# Patient Record
Sex: FEMALE | Race: WHITE | NOT HISPANIC OR LATINO | Employment: OTHER | URBAN - METROPOLITAN AREA
[De-identification: names, ages, dates, MRNs, and addresses within clinical notes are randomized per-mention and may not be internally consistent; named-entity substitution may affect disease eponyms.]

---

## 2018-04-06 ENCOUNTER — OFFICE VISIT (OUTPATIENT)
Dept: SURGERY | Facility: CLINIC | Age: 68
End: 2018-04-06

## 2018-04-06 VITALS — BODY MASS INDEX: 19.25 KG/M2 | HEIGHT: 68 IN | HEART RATE: 87 BPM | WEIGHT: 127 LBS | OXYGEN SATURATION: 99 %

## 2018-04-06 DIAGNOSIS — K64.4 EXTERNAL HEMORRHOID: Primary | ICD-10-CM

## 2018-04-06 PROCEDURE — 99203 OFFICE O/P NEW LOW 30 MIN: CPT | Performed by: SURGERY

## 2018-04-06 RX ORDER — MULTIVITAMIN
1 CAPSULE ORAL DAILY
COMMUNITY

## 2018-04-06 RX ORDER — HYDROXYZINE HYDROCHLORIDE 10 MG/1
10 TABLET, FILM COATED ORAL 2 TIMES DAILY
COMMUNITY
End: 2019-06-28 | Stop reason: ALTCHOICE

## 2018-04-20 NOTE — PROGRESS NOTES
General Surgery  Initial Office Visit    CC: Rectal bleeding    HPI: The patient is a pleasant 68 y.o. year-old lady who presents today for a recent episode of acute onset rectal bleeding that occurred one week ago. The bleeding occurred after she developed what she thought was an external hemorrhoid. The bleeding persisted for a few days but slowly improved until it stopped altogether a few days ago. She has never had rectal bleeding like this before.    Past Medical History: Anxiety    Past Surgical History: None    Medications: Hydroxyzine, multivitamin    Allergies: No known drug allergies    Family History: Father with prostate cancer and coronary artery disease, brother with lymphoma, sister with sarcoma    Social History: , retired [employer] teacher, nonsmoker, no regular alcohol use    ROS:   Constitutional: Negative for fevers or chills  HENT: Negative for hearing loss or runny nose  Eyes: Negative for vision changes or scleral icterus  Respiratory: Negative for cough or shortness of breath  Cardiovascular: Negative for chest pain or heart palpitations  Gastrointestinal: Positive for hematochezia; Negative for abdominal pain, nausea, vomiting, constipation, melena, or reflux  Genitourinary: Positive for vulvodynia; Negative for hematuria or dysuria  Musculoskeletal: Positive for back pain; Negative for joint pain  Neurologic: Negative for headaches or dizziness  Psychiatric: Positive for nervousness and anxiety; Negative for depression  All other systems reviewed and negative    Physical Exam:  Vitals:    04/06/18 0857   Pulse: 87   SpO2: 99%     General: No acute distress, well-nourished & well-developed  HEAD: normocephalic, atraumatic  EYES: normal conjunctiva, sclera anicteric  EARS: grossly normal hearing  NECK: supple, no thyromegaly  CARDIOVASCULAR: regular rate and rhythm  RESPIRATORY: clear to auscultation bilaterally  GASTROINTESTINAL: soft, nontender, non-distended  MUSCULOSKELETAL: normal gait  and station. No gross extremity abnormalities  PSYCHIATRIC: oriented x3, normal mood and affect  RECTUM: external hemorrhoid with small area of skin breakdown indicative of recently evacuated thrombosed external hemorrhoid    ASSESSMENT & PLAN  Mrs. Cruz is a 68 year-old lady with a recently thrombosed external hemorrhoid which seems to have spontaneously drained on its own. I have recommended she avoid constipation with the use of stool softeners, fiber supplements, and/or laxatives. I have also prescribed Proctofoam for her to use as needed for any recurrent hemorrhoids. She can follow-up with me as needed.    Crystal Evangelista MD  General and Endoscopic Surgery  [facility]    [address], [address]  [place], [place], [address]  P: [phone]  F: [phone]

## 2018-05-30 RX ORDER — GABAPENTIN 100 MG/1
100 CAPSULE ORAL 3 TIMES DAILY
COMMUNITY
Start: 2017-05-13 | End: 2018-08-13

## 2018-06-01 ENCOUNTER — OFFICE VISIT (OUTPATIENT)
Dept: INTERNAL MEDICINE | Age: 68
End: 2018-06-01

## 2018-06-01 VITALS
RESPIRATION RATE: 13 BRPM | OXYGEN SATURATION: 96 % | TEMPERATURE: 97.7 F | HEART RATE: 66 BPM | DIASTOLIC BLOOD PRESSURE: 70 MMHG | HEIGHT: 68 IN | WEIGHT: 124 LBS | SYSTOLIC BLOOD PRESSURE: 138 MMHG | BODY MASS INDEX: 18.79 KG/M2

## 2018-06-01 DIAGNOSIS — M79.606 LOW BACK PAIN RADIATING TO LOWER EXTREMITY: Primary | ICD-10-CM

## 2018-06-01 DIAGNOSIS — R53.83 FATIGUE, UNSPECIFIED TYPE: ICD-10-CM

## 2018-06-01 DIAGNOSIS — M54.31 SCIATICA OF RIGHT SIDE: ICD-10-CM

## 2018-06-01 DIAGNOSIS — M54.50 LOW BACK PAIN RADIATING TO LOWER EXTREMITY: Primary | ICD-10-CM

## 2018-06-01 PROBLEM — R07.9 CHEST PAIN: Status: ACTIVE | Noted: 2018-06-01

## 2018-06-01 PROBLEM — F41.9 ANXIETY: Status: ACTIVE | Noted: 2018-06-01

## 2018-06-01 PROBLEM — J18.9 PNA (PNEUMONIA): Status: ACTIVE | Noted: 2018-06-01

## 2018-06-01 PROBLEM — L65.9 ALOPECIA: Status: ACTIVE | Noted: 2018-06-01

## 2018-06-01 PROBLEM — J18.9 PNA (PNEUMONIA): Status: RESOLVED | Noted: 2018-06-01 | Resolved: 2018-06-01

## 2018-06-01 PROBLEM — R10.9 ABDOMINAL PAIN: Status: ACTIVE | Noted: 2018-06-01

## 2018-06-01 PROCEDURE — 99214 OFFICE O/P EST MOD 30 MIN: CPT | Performed by: INTERNAL MEDICINE

## 2018-06-01 NOTE — PROGRESS NOTES
"  Tricia Cruz is a 68 y.o. female who presents with   Chief Complaint   Patient presents with   • Back Pain   • Sciatic like symptoms-right foot numb   • Fatigue   .    68-year-old female who I am seeing for the first time.  Former patient of Dr. Chacon.  Patient presents with a history of 25 years of chronic back pain with a prior diagnosis of \"L4-5 facet syndrome\".  Has recently been under chiropractic care for this but is now having pain in her low back, hips, coccyx and also numbness in her right foot suggesting right leg sciatic like symptoms.  She says her current symptoms are new and started about 4 weeks ago and are constantly present but gradually worsening over the course of time.      Back Pain   This is a new problem. The current episode started 1 to 4 weeks ago. The problem occurs constantly. The problem has been gradually worsening since onset. The pain is present in the gluteal and sacro-iliac. The quality of the pain is described as aching and shooting. The pain radiates to the left foot, left thigh, right foot and right thigh. The pain is at a severity of 7/10. The pain is the same all the time. The symptoms are aggravated by position, lying down and sitting. Stiffness is present all day. Associated symptoms include leg pain, numbness, paresthesias, pelvic pain and tingling. Pertinent negatives include no abdominal pain, bladder incontinence, bowel incontinence, chest pain, dysuria, fever, headaches, paresis, perianal numbness, weakness or weight loss. Risk factors include menopause. She has tried nothing for the symptoms.   Fatigue   This is a chronic problem. The current episode started more than 1 month ago. The problem occurs intermittently. The problem has been resolved. Associated symptoms include fatigue and numbness. Pertinent negatives include no abdominal pain, chest pain, fever, headaches or weakness. She has tried nothing for the symptoms.        The following portions of the " patient's history were reviewed and updated as appropriate: allergies, current medications, past medical history and problem list.    Review of Systems   Constitutional: Positive for fatigue. Negative for fever and weight loss.   HENT: Negative.    Eyes: Negative.    Respiratory: Negative.    Cardiovascular: Negative.  Negative for chest pain.   Gastrointestinal: Negative for abdominal pain and bowel incontinence.   Genitourinary: Positive for pelvic pain. Negative for bladder incontinence and dysuria.   Musculoskeletal: Positive for back pain.   Skin: Negative.    Neurological: Positive for tingling, numbness and paresthesias. Negative for weakness and headaches.   Psychiatric/Behavioral: Negative.        Objective   Physical Exam   Constitutional: She is oriented to person, place, and time. She appears well-developed and well-nourished. No distress.   HENT:   Head: Normocephalic and atraumatic.   Eyes: Conjunctivae and EOM are normal. Pupils are equal, round, and reactive to light.   Neck: Normal range of motion. Neck supple. No thyromegaly present.   Neck exam negative.  Carotid auscultation normal-no bruits heard.   Cardiovascular: Normal rate, regular rhythm, normal heart sounds and intact distal pulses.  Exam reveals no gallop and no friction rub.    No murmur heard.  Pulmonary/Chest: Effort normal and breath sounds normal. No respiratory distress. She has no wheezes. She has no rales. She exhibits no tenderness.   Musculoskeletal: Normal range of motion. She exhibits no edema, tenderness or deformity.   Neurological: She is alert and oriented to person, place, and time. No cranial nerve deficit or sensory deficit. She exhibits normal muscle tone. Coordination normal.   Skin: No rash noted. No erythema.   Psychiatric: She has a normal mood and affect. Her behavior is normal. Judgment and thought content normal.   Nursing note and vitals reviewed.      Assessment/Plan   Tricia was seen today for back pain,  sciatic like symptoms-right foot numb and fatigue.    Diagnoses and all orders for this visit:    Low back pain radiating to lower extremity  -     Comprehensive Metabolic Panel  -     Lipid Panel  -     MRI Lumbar Spine Without Contrast; Future    Sciatica of right side  -     Comprehensive Metabolic Panel  -     Lipid Panel  -     MRI Lumbar Spine Without Contrast; Future    Fatigue, unspecified type  -     Comprehensive Metabolic Panel  -     CBC & Differential  -     Lipid Panel  -     TSH+Free T4      Plan: This lady has not had any radiographic studies recently on her back she says nor has she had any recent blood testing done.  She does take gabapentin 900 mg 3 times daily but apparently this is being prescribed by a gynecologist.    I'm going to get her set up for an open MRI at the facility on [facility] per her request since she has some element of claustrophobia she says.  We will also get some basic labs as above to assess her overall general metabolism.  She will try Advil, 2 tablets 3 times a day for her current back/right leg symptoms pending today's lab results and MRI report.  Further advice to follow once reports are in.

## 2018-06-02 LAB
ALBUMIN SERPL-MCNC: 4.5 G/DL (ref 3.5–5.2)
ALBUMIN/GLOB SERPL: 2.3 G/DL
ALP SERPL-CCNC: 67 U/L (ref 39–117)
ALT SERPL-CCNC: 18 U/L (ref 1–33)
AST SERPL-CCNC: 19 U/L (ref 1–32)
BASOPHILS # BLD AUTO: 0.01 10*3/MM3 (ref 0–0.2)
BASOPHILS NFR BLD AUTO: 0.2 % (ref 0–1.5)
BILIRUB SERPL-MCNC: 0.5 MG/DL (ref 0.1–1.2)
BUN SERPL-MCNC: 20 MG/DL (ref 8–23)
BUN/CREAT SERPL: 19.2 (ref 7–25)
CALCIUM SERPL-MCNC: 9.6 MG/DL (ref 8.6–10.5)
CHLORIDE SERPL-SCNC: 100 MMOL/L (ref 98–107)
CHOLEST SERPL-MCNC: 183 MG/DL (ref 0–200)
CO2 SERPL-SCNC: 27.4 MMOL/L (ref 22–29)
CREAT SERPL-MCNC: 1.04 MG/DL (ref 0.57–1)
EOSINOPHIL # BLD AUTO: 0.04 10*3/MM3 (ref 0–0.7)
EOSINOPHIL NFR BLD AUTO: 0.9 % (ref 0.3–6.2)
ERYTHROCYTE [DISTWIDTH] IN BLOOD BY AUTOMATED COUNT: 12.7 % (ref 11.7–13)
GFR SERPLBLD CREATININE-BSD FMLA CKD-EPI: 53 ML/MIN/1.73
GFR SERPLBLD CREATININE-BSD FMLA CKD-EPI: 64 ML/MIN/1.73
GLOBULIN SER CALC-MCNC: 2 GM/DL
GLUCOSE SERPL-MCNC: 86 MG/DL (ref 65–99)
HCT VFR BLD AUTO: 39.8 % (ref 35.6–45.5)
HDLC SERPL-MCNC: 87 MG/DL (ref 40–60)
HGB BLD-MCNC: 12.4 G/DL (ref 11.9–15.5)
IMM GRANULOCYTES # BLD: 0 10*3/MM3 (ref 0–0.03)
IMM GRANULOCYTES NFR BLD: 0 % (ref 0–0.5)
LDLC SERPL CALC-MCNC: 88 MG/DL (ref 0–100)
LYMPHOCYTES # BLD AUTO: 1.44 10*3/MM3 (ref 0.9–4.8)
LYMPHOCYTES NFR BLD AUTO: 33.4 % (ref 19.6–45.3)
MCH RBC QN AUTO: 30.8 PG (ref 26.9–32)
MCHC RBC AUTO-ENTMCNC: 31.2 G/DL (ref 32.4–36.3)
MCV RBC AUTO: 98.8 FL (ref 80.5–98.2)
MONOCYTES # BLD AUTO: 0.57 10*3/MM3 (ref 0.2–1.2)
MONOCYTES NFR BLD AUTO: 13.2 % (ref 5–12)
NEUTROPHILS # BLD AUTO: 2.25 10*3/MM3 (ref 1.9–8.1)
NEUTROPHILS NFR BLD AUTO: 52.3 % (ref 42.7–76)
PLATELET # BLD AUTO: 162 10*3/MM3 (ref 140–500)
POTASSIUM SERPL-SCNC: 4.3 MMOL/L (ref 3.5–5.2)
PROT SERPL-MCNC: 6.5 G/DL (ref 6–8.5)
RBC # BLD AUTO: 4.03 10*6/MM3 (ref 3.9–5.2)
SODIUM SERPL-SCNC: 143 MMOL/L (ref 136–145)
T4 FREE SERPL-MCNC: 0.97 NG/DL (ref 0.93–1.7)
TRIGL SERPL-MCNC: 42 MG/DL (ref 0–150)
TSH SERPL DL<=0.005 MIU/L-ACNC: 3.55 MIU/ML (ref 0.27–4.2)
VLDLC SERPL CALC-MCNC: 8.4 MG/DL (ref 5–40)
WBC # BLD AUTO: 4.31 10*3/MM3 (ref 4.5–10.7)

## 2018-06-04 ENCOUNTER — TELEPHONE (OUTPATIENT)
Dept: INTERNAL MEDICINE | Age: 68
End: 2018-06-04

## 2018-06-04 NOTE — PROGRESS NOTES
Thyroid normal but on the lower end of normal. All other labs are within normal / acceptable ranges. Continue all current meds as they are. A followup visit to be seen and have labs updated in six months is advised. If any problems persist a followup visit is advised.

## 2018-06-04 NOTE — TELEPHONE ENCOUNTER
Pt called stating she called [facility] on [facility] and they have not received the order for pt's MRI for Lumbar Spine. They told her as soon as they receive it, they will call her to schedule.  [facility]  Pt's # [phone]  Thanks SP

## 2018-06-14 NOTE — TELEPHONE ENCOUNTER
Pt called stating she hasn't heard from our office pertaining to her insurance covering the MRI lumbar spine. She mentioned she thought Dr Leach was having to do a peer to peer. Nothing in pt's chart.  Pt will be in tomorrow to see Sakina for UTI and was going to ask about the MRI.  Thanks SP

## 2018-06-15 ENCOUNTER — OFFICE VISIT (OUTPATIENT)
Dept: INTERNAL MEDICINE | Age: 68
End: 2018-06-15

## 2018-06-15 VITALS
WEIGHT: 125.8 LBS | SYSTOLIC BLOOD PRESSURE: 130 MMHG | HEART RATE: 86 BPM | DIASTOLIC BLOOD PRESSURE: 72 MMHG | TEMPERATURE: 98.1 F | OXYGEN SATURATION: 98 % | HEIGHT: 68 IN | BODY MASS INDEX: 19.07 KG/M2

## 2018-06-15 DIAGNOSIS — M79.606 LOW BACK PAIN RADIATING TO LOWER EXTREMITY: ICD-10-CM

## 2018-06-15 DIAGNOSIS — Z87.440 HISTORY OF UTI: Primary | ICD-10-CM

## 2018-06-15 DIAGNOSIS — N94.819 VULVODYNIA: ICD-10-CM

## 2018-06-15 DIAGNOSIS — M54.50 LOW BACK PAIN RADIATING TO LOWER EXTREMITY: ICD-10-CM

## 2018-06-15 PROCEDURE — 99214 OFFICE O/P EST MOD 30 MIN: CPT | Performed by: NURSE PRACTITIONER

## 2018-06-15 RX ORDER — MELOXICAM 7.5 MG/1
7.5 TABLET ORAL DAILY
Qty: 30 TABLET | Refills: 0 | Status: SHIPPED | OUTPATIENT
Start: 2018-06-15 | End: 2018-07-16 | Stop reason: SINTOL

## 2018-06-15 NOTE — PATIENT INSTRUCTIONS
Back Pain, Adult    Many adults have back pain from time to time. Common causes of back pain include:  · A strained muscle or ligament.  · Wear and tear (degeneration) of the spinal disks.  · Arthritis.  · A hit to the back.    Back pain can be short-lived (acute) or last a long time (chronic). A physical exam, lab tests, and imaging studies may be done to find the cause of your pain.  Follow these instructions at home:  Managing pain and stiffness  · Take over-the-counter and prescription medicines only as told by your health care provider.  · If directed, apply heat to the affected area as often as told by your health care provider. Use the heat source that your health care provider recommends, such as a moist heat pack or a heating pad.  ? Place a towel between your skin and the heat source.  ? Leave the heat on for 20-30 minutes.  ? Remove the heat if your skin turns bright red. This is especially important if you are unable to feel pain, heat, or cold. You have a greater risk of getting burned.  · If directed, apply ice to the injured area:  ? Put ice in a plastic bag.  ? Place a towel between your skin and the bag.  ? Leave the ice on for 20 minutes, 2-3 times a day for the first 2-3 days.  Activity  · Do not stay in bed. Resting more than 1-2 days can delay your recovery.  · Take short walks on even surfaces as soon as you are able. Try to increase the length of time you walk each day.  · Do not sit, drive, or  one place for more than 30 minutes at a time. Sitting or standing for long periods of time can put stress on your back.  · Use proper lifting techniques. When you bend and lift, use positions that put less stress on your back:  ? Bend your knees.  ? Keep the load close to your body.  ? Avoid twisting.  · Exercise regularly as told by your health care provider. Exercising will help your back heal faster. This also helps prevent back injuries by keeping muscles strong and flexible.  · Your health  care provider may recommend that you see a physical therapist. This person can help you come up with a safe exercise program. Do any exercises as told by your physical therapist.  Lifestyle  · Maintain a healthy weight. Extra weight puts stress on your back and makes it difficult to have good posture.  · Avoid activities or situations that make you feel anxious or stressed. Learn ways to manage anxiety and stress. One way to manage stress is through exercise. Stress and anxiety increase muscle tension and can make back pain worse.  General instructions  · Sleep on a firm mattress in a comfortable position. Try lying on your side with your knees slightly bent. If you lie on your back, put a pillow under your knees.  · Follow your treatment plan as told by your health care provider. This may include:  ? Cognitive or behavioral therapy.  ? Acupuncture or massage therapy.  ? Meditation or yoga.  Contact a health care provider if:  · You have pain that is not relieved with rest or medicine.  · You have increasing pain going down into your legs or buttocks.  · Your pain does not improve in 2 weeks.  · You have pain at night.  · You lose weight.  · You have a fever or chills.  Get help right away if:  · You develop new bowel or bladder control problems.  · You have unusual weakness or numbness in your arms or legs.  · You develop nausea or vomiting.  · You develop abdominal pain.  · You feel faint.  Summary  · Many adults have back pain from time to time. A physical exam, lab tests, and imaging studies may be done to find the cause of your pain.  · Use proper lifting techniques. When you bend and lift, use positions that put less stress on your back.  · Take over-the-counter and prescription medicines and apply heat or ice as directed by your health care provider.  This information is not intended to replace advice given to you by your health care provider. Make sure you discuss any questions you have with your health care  provider.  Document Released: 12/18/2006 Document Revised: 01/22/2018 Document Reviewed: 01/22/2018  [employer] Interactive Patient Education © 2018 [employer]

## 2018-06-15 NOTE — PROGRESS NOTES
"Fermin Cruz is a 68 y.o. female.     Urinary Tract Infection    This is a new problem. Episode onset: early May. The problem has been resolved (Took course of Macrobid back in May). Pertinent negatives include no frequency or urgency.    She had urine culture done at her GYN after this and was told \"normal\" but she has questions about her results as her culture does show some bacteria growth. At that time, she was also diagnosed with vulvodynia and \"pudendal nerve issue\" so she is not sure if her urinary tract symptoms have resolved, although she is no longer having frequency.     She also recently saw Dr. Leach two weeks ago for chronic low back pain with radiculopathy; at that time, MRI was ordered. However, patient received mail notice that her insurance will not cover MRI until she has had 6 weeks of conservative therapy and re-evaluation. She does not have any leg weakness, cauda equina symptoms, fever, or severe acute change in pain. She is interested in pursuing a course of physical therapy.     The following portions of the patient's history were reviewed and updated as appropriate: allergies, current medications, past family history, past medical history, past social history, past surgical history and problem list.    Review of Systems   Genitourinary: Positive for dysuria (intermittent r/t vulvodynia) and pelvic pain. Negative for frequency and urgency.       Objective   Physical Exam   Constitutional: She appears well-developed and well-nourished. She is active. She does not appear ill. No distress.   Cardiovascular: Normal rate, regular rhythm and normal heart sounds.    Pulmonary/Chest: Effort normal and breath sounds normal. She has no decreased breath sounds. She has no wheezes. She has no rhonchi. She has no rales.   Neurological: She is alert.   Nursing note and vitals reviewed.        Assessment/Plan   Problems Addressed this Visit        Nervous and Auditory    Low back pain " radiating to lower extremity    Relevant Medications    meloxicam (MOBIC) 7.5 MG tablet    Other Relevant Orders    Ambulatory Referral to Physical Therapy Evaluate and treat      Other Visit Diagnoses     History of UTI    -  Primary    Vulvodynia            1. History of UTI  Patient has brought her culture results from GYN which shows 10-50,000 colony count of group B strep. Discussed with patient that UTI is indicated by count > 100,000 and that group B strep is likely a part of her normal vaginal kristina and not pathogenic. UA done in office today is negative for infection. Instructed to continue to monitor for worsening urinary symptoms and follow up as needed.     2. Vulvodynia      3. Low back pain radiating to lower extremity  Patient had MRI ordered at last visit but insurance has refused at this point. Patient is agreeable to trial of physical therapy and NSAID and will follow up with Dr. Leach in 4 weeks for re-evaluation and consideration of reorder or lumbar spine MRI. Patient Instructed not to take any additional NSAIDs including ibuprofen, Aleve, Advil, naproxen while taking prescribed NSAID. Follow up sooner if symptoms worsen or any red flag symptoms.     - Ambulatory Referral to Physical Therapy Evaluate and treat  - meloxicam (MOBIC) 7.5 MG tablet; Take 1 tablet by mouth Daily.  Dispense: 30 tablet; Refill: 0

## 2018-07-02 ENCOUNTER — HOSPITAL ENCOUNTER (OUTPATIENT)
Dept: PHYSICAL THERAPY | Facility: HOSPITAL | Age: 68
Setting detail: RECURRING SERIES
Discharge: HOME | End: 2018-08-29
Attending: NURSE PRACTITIONER | Admitting: NURSE PRACTITIONER

## 2018-07-16 ENCOUNTER — OFFICE VISIT (OUTPATIENT)
Dept: INTERNAL MEDICINE | Age: 68
End: 2018-07-16

## 2018-07-16 VITALS
HEIGHT: 68 IN | DIASTOLIC BLOOD PRESSURE: 70 MMHG | RESPIRATION RATE: 13 BRPM | SYSTOLIC BLOOD PRESSURE: 110 MMHG | WEIGHT: 123 LBS | TEMPERATURE: 97.1 F | OXYGEN SATURATION: 100 % | HEART RATE: 79 BPM | BODY MASS INDEX: 18.64 KG/M2

## 2018-07-16 DIAGNOSIS — M54.41 CHRONIC RIGHT-SIDED LOW BACK PAIN WITH RIGHT-SIDED SCIATICA: Primary | ICD-10-CM

## 2018-07-16 DIAGNOSIS — R39.15 URINARY URGENCY: ICD-10-CM

## 2018-07-16 DIAGNOSIS — G89.29 CHRONIC RIGHT-SIDED LOW BACK PAIN WITH RIGHT-SIDED SCIATICA: Primary | ICD-10-CM

## 2018-07-16 PROBLEM — N94.819 VULVODYNIA: Status: ACTIVE | Noted: 2018-07-16

## 2018-07-16 PROCEDURE — 99214 OFFICE O/P EST MOD 30 MIN: CPT | Performed by: INTERNAL MEDICINE

## 2018-07-16 RX ORDER — IBUPROFEN 200 MG
200 TABLET ORAL EVERY 6 HOURS PRN
COMMUNITY
End: 2019-06-28 | Stop reason: ALTCHOICE

## 2018-07-16 NOTE — PROGRESS NOTES
"  Tricia Cruz is a 68 y.o. female who presents with   Chief Complaint   Patient presents with   • Back Pain   • Urinary Urgency   .    68-year-old female seen by me for the first time about a month ago.  She is a former patient of Dr. Chacon.  The problem back then and still currently is low back pain with right leg sciatic pain.  We attempted to get an MRI of her low back however her insurance company denied it and when I attempted to talk to them on a \" peer to peer\" telephone call they never did return my call for that conversation nor did they leave a telephone number for me to call back.  The end result is that the patient never had the MRI of the lumbosacral spine that was ordered a month ago.  She continues to have low back pain, pain around both hips, pain between her legs-- in her groin and vulvar area.  She does give a history of \"vulvodynia\" that is being treated and managed by her gynecologist.  She also is getting physical therapy now and was not getting it a month or so ago when I saw her.  She had been going for chiropractic care for her low back and x-rays had been done through that office she says.  So far she has had 3 physical therapy sessions and is anticipating getting at least 2 sessions per week.  She describes continued right leg sciatic pain/numbness into the right foot.  Gabapentin 100 mg 3 times a day is ineffective she says.  She does take Advil when necessary.  She is intolerant of meloxicam which made her feel \"goofy and nauseous\".  In addition to all of the above she is complaining of urinary urgency but no fever, chills or painful urination and no visible hematuria.      Back Pain   This is a chronic problem. The current episode started more than 1 year ago. The problem is unchanged. The pain is present in the lumbar spine. The pain radiates to the right foot. The pain is moderate. The pain is the same all the time. Associated symptoms include numbness. She has tried NSAIDs " (Gabapentin) for the symptoms. The treatment provided no relief.      Urinary urgency: History as outlined above.    The following portions of the patient's history were reviewed and updated as appropriate: allergies, current medications, past medical history and problem list.    Review of Systems   Constitutional: Negative.    HENT: Negative.    Eyes: Negative.    Respiratory: Negative.    Cardiovascular: Negative.    Genitourinary: Negative.    Musculoskeletal: Positive for back pain.   Skin: Negative.    Neurological: Positive for numbness.   Psychiatric/Behavioral: Negative.        Objective   Physical Exam   Constitutional: She is oriented to person, place, and time. She appears well-developed and well-nourished. No distress.   HENT:   Head: Normocephalic and atraumatic.   Eyes: Conjunctivae and EOM are normal. Pupils are equal, round, and reactive to light.   Neck: Normal range of motion. Neck supple. No thyromegaly present.   Neck exam negative.  Carotid auscultation normal-no bruits heard.   Cardiovascular: Normal rate, regular rhythm, normal heart sounds and intact distal pulses.  Exam reveals no gallop and no friction rub.    No murmur heard.  Pulmonary/Chest: Effort normal and breath sounds normal. No respiratory distress. She has no wheezes. She has no rales. She exhibits no tenderness.   Musculoskeletal:   Palpably tender in the right sacroiliac area.  Her gait seems unaffected.  Sensory function appears normal bilaterally.  Distal pulsations appear palpably normal and intact bilaterally.   Neurological: She is alert and oriented to person, place, and time.   Psychiatric: She has a normal mood and affect. Her behavior is normal. Judgment and thought content normal.   Nursing note and vitals reviewed.      Assessment/Plan   Tricia was seen today for back pain and urinary urgency.    Diagnoses and all orders for this visit:    Chronic right-sided low back pain with right-sided sciatica    Urinary  "urgency  -     Urinalysis With Culture If Indicated - Urine, Clean Catch      Plan: With regards to her back I have suggested a dose of Advil at 2 tablets 3 times a day with food.  She has discontinued meloxicam because it made her feel \"goofy and nauseous\".  She has not had any x-rays of her lumbosacral spine she says \"for a long time\" and I have suggested those on today's visit however she wants to check with her insurance carrier for coverage.  I have also suggested a possible CT scan of the lumbosacral spine since her insurance company would not allow an MRI.  She also will check with her insurance carrier for coverage.    I also have suggested a DEXA scan since she has not had one but again she wants to check with her insurance carrier for coverage.    For now she will continue with her physical therapy and Advil usage as advised above.  If she wishes to pursue x-rays, CT scan or bone density then she will let us know and we will proceed as per her wishes.    Otherwise we will plan on seeing her in December for her six-month checkup/lab update visit.         "

## 2018-07-17 LAB
APPEARANCE UR: ABNORMAL
BACTERIA #/AREA URNS HPF: NORMAL /[HPF]
BILIRUB UR QL STRIP: NEGATIVE
COLOR UR: YELLOW
EPI CELLS #/AREA URNS HPF: NORMAL /HPF
GLUCOSE UR QL: NEGATIVE
HGB UR QL STRIP: NEGATIVE
KETONES UR QL STRIP: NEGATIVE
LEUKOCYTE ESTERASE UR QL STRIP: NEGATIVE
MICRO URNS: ABNORMAL
MICRO URNS: ABNORMAL
MUCOUS THREADS URNS QL MICRO: PRESENT
NITRITE UR QL STRIP: NEGATIVE
PH UR STRIP: 7 [PH] (ref 5–7.5)
PROT UR QL STRIP: NEGATIVE
RBC #/AREA URNS HPF: NORMAL /HPF
SP GR UR: 1.01 (ref 1–1.03)
URINALYSIS REFLEX: ABNORMAL
UROBILINOGEN UR STRIP-MCNC: 0.2 MG/DL (ref 0.2–1)
WBC #/AREA URNS HPF: NORMAL /HPF

## 2018-07-17 NOTE — PROGRESS NOTES
Urinalysis shows no signs of a urinary tract infection and no treatment therefore will be necessary for such.

## 2018-07-20 ENCOUNTER — TELEPHONE (OUTPATIENT)
Dept: INTERNAL MEDICINE | Age: 68
End: 2018-07-20

## 2018-07-20 NOTE — TELEPHONE ENCOUNTER
Janay with [facility] called asking if Dr Leach would do a peer to peer?  Janay's # [phone]  Thanks SP

## 2018-07-23 ENCOUNTER — TELEPHONE (OUTPATIENT)
Dept: INTERNAL MEDICINE | Age: 68
End: 2018-07-23

## 2018-07-23 ENCOUNTER — DOCUMENTATION (OUTPATIENT)
Dept: INTERNAL MEDICINE | Age: 68
End: 2018-07-23

## 2018-07-23 NOTE — PROGRESS NOTES
"July 23, 2018: Received call today from \"Janay\" at [facility] to call back for a \" peer to peer\" discussion regarding a lumbosacral MRI that I ordered at 1 June.  The number given was [phone] get one this number was called and we could not get through to Janay or anyone at [facility] to do this review.  The message was recorded and just kept running us around in circles-getting nowhere and never talking to a live human being.  We have been receiving calls and trying to return calls on this since 1 June but so far we haven't gotten anywhere with it approximately 6-8 weeks later.  "

## 2018-07-23 NOTE — TELEPHONE ENCOUNTER
Spoke with Charla with [facility] and the Peer to Peer is scheduled for tomorrow 7-24-18 @ 11:45 am and a Dr. April Avilez will be calling Dr. Leach.  If needed [facility] # [phone] Ref# [id number]  Thanks SP

## 2018-07-23 NOTE — TELEPHONE ENCOUNTER
Mallika from [facility] called to tell me that the patient is scheduled for 7/24/18 at 11:30AM for her MRI Lumbar Spine. I spoke to Margaret and Panda stated it is in peer to peer review. Panda stated she tried to call the insurance company twice today to see if they could do the peer to peer today (7/23/18) vs. tomorrow (7/24/18) but she could not get anyone on the phone to help her. The peer to peer is scheduled for 7/24/18 at 11:45AM. I called [facility] to get them to call the patient to move her to a later date this week so we can see how the peer to peer goes.

## 2018-07-24 ENCOUNTER — DOCUMENTATION (OUTPATIENT)
Dept: INTERNAL MEDICINE | Age: 68
End: 2018-07-24

## 2018-07-24 NOTE — PROGRESS NOTES
"July 24, 2018: Telephone call-Peer to peer with insurance physician-Dr.Amy Avilez.  After describing the patient's clinical symptoms from June 1 and on the subsequent visit on July 16 I was informed that the request for the lumbosacral MRI was \"improved\" and that the approval number would be faxed to the office \"within 12 business hours\".    "

## 2018-07-25 ENCOUNTER — TELEPHONE (OUTPATIENT)
Dept: INTERNAL MEDICINE | Age: 68
End: 2018-07-25

## 2018-07-25 DIAGNOSIS — M79.606 LOW BACK PAIN RADIATING TO LOWER EXTREMITY: Primary | ICD-10-CM

## 2018-07-25 DIAGNOSIS — M54.50 LOW BACK PAIN RADIATING TO LOWER EXTREMITY: Primary | ICD-10-CM

## 2018-07-25 NOTE — TELEPHONE ENCOUNTER
----- Message from Sarath Leach MD sent at 7/25/2018 12:25 PM EDT -----  MRI of the lumbosacral spine suggests spinal stenosis–narrowing of the spinal canal at the S2 level of the sacrum.  There appears to be a possible cyst in this region contributing to the spinal canal narrowing.  Neurosurgical referral and evaluation will be necessary.( Mistee: Intrauterine order to Dr. Castrejon or  for neurosurgical referral/evaluation of this issue with low back pain and sciatic pain into the right leg as well.).

## 2018-07-26 ENCOUNTER — TELEPHONE (OUTPATIENT)
Dept: INTERNAL MEDICINE | Age: 68
End: 2018-07-26

## 2018-07-26 NOTE — TELEPHONE ENCOUNTER
Pt called wanting to speak with medical assistant regarding her recent MRI lumbar spine.   Pt's # [phone]  Thanks SP

## 2018-08-08 ENCOUNTER — TELEPHONE (OUTPATIENT)
Dept: NEUROSURGERY | Facility: CLINIC | Age: 68
End: 2018-08-08

## 2018-08-08 NOTE — TELEPHONE ENCOUNTER
Patient has an appt with Nadine in a month. She called today because she's experiencing increased pain and wanted to know if we could review her records to see if she needs to be seen sooner. Can we have Nadine look at her MRI and advise? She simply wants to make sure she is ok to wait a month, and doesn't need to go to the ER.

## 2018-08-13 ENCOUNTER — OFFICE VISIT (OUTPATIENT)
Dept: NEUROSURGERY | Facility: CLINIC | Age: 68
End: 2018-08-13

## 2018-08-13 VITALS
SYSTOLIC BLOOD PRESSURE: 120 MMHG | WEIGHT: 123 LBS | HEART RATE: 64 BPM | HEIGHT: 68 IN | BODY MASS INDEX: 18.64 KG/M2 | DIASTOLIC BLOOD PRESSURE: 70 MMHG

## 2018-08-13 DIAGNOSIS — G96.191 TARLOV CYST: Primary | ICD-10-CM

## 2018-08-13 DIAGNOSIS — M51.36 DDD (DEGENERATIVE DISC DISEASE), LUMBAR: ICD-10-CM

## 2018-08-13 DIAGNOSIS — R10.32 PAIN IN THE GROIN, LEFT: ICD-10-CM

## 2018-08-13 PROBLEM — M51.369 DDD (DEGENERATIVE DISC DISEASE), LUMBAR: Status: ACTIVE | Noted: 2018-08-13

## 2018-08-13 PROCEDURE — 99244 OFF/OP CNSLTJ NEW/EST MOD 40: CPT | Performed by: NEUROLOGICAL SURGERY

## 2018-08-13 RX ORDER — GABAPENTIN 300 MG/1
300 CAPSULE ORAL 3 TIMES DAILY
Qty: 90 CAPSULE | Refills: 3 | Status: SHIPPED | OUTPATIENT
Start: 2018-08-13 | End: 2018-10-17 | Stop reason: DRUGHIGH

## 2018-08-13 NOTE — PROGRESS NOTES
Subjective   Patient ID: Tricia Cruz is a 68 y.o. female is being seen for consultation today at the request of Sarath Leach MD for low back and numbness in feet, pelvic pain with urgent urination    History of Present Illness    The following portions of the patient's history were reviewed and updated as appropriate: allergies, current medications, past family history, past medical history, past social history, past surgical history and problem list.    Review of Systems   Constitutional: Positive for activity change.   Genitourinary: Positive for pelvic pain and urgency.   Musculoskeletal: Positive for back pain and neck pain.   Neurological: Positive for numbness.   Psychiatric/Behavioral: Positive for agitation. The patient is nervous/anxious.    All other systems reviewed and are negative.    The patient is here with her . She is retired. 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 About 6 months ago, she began having pain in the left side of her lower buttock that radiated into her groin and into the left side of the vulva. There is a desired therapeutic benefit without unwanted side effects. Will try that approach and have her come back in 2 months.      Objective   Physical Exam   Constitutional: She is oriented to person, place, and time. She appears well-developed and well-nourished.   HENT:   Head: Normocephalic and atraumatic.   Eyes: Pupils are equal, round, and reactive to light. Conjunctivae and EOM are normal.   Fundoscopic exam:       The right eye shows no papilledema. The right eye shows venous pulsations.        The left eye shows no papilledema. The left eye shows venous pulsations.   Neck: Carotid bruit is not present.   Neurological: She is oriented to person, place, and time. She has a normal Finger-Nose-Finger Test and a normal Heel to Shin Test. Gait normal.   Reflex Scores:       Tricep reflexes are 2+ on the right side and 2+ on the left side.       Bicep  reflexes are 2+ on the right side and 2+ on the left side.       Brachioradialis reflexes are 2+ on the right side and 2+ on the left side.       Patellar reflexes are 2+ on the right side and 2+ on the left side.       Achilles reflexes are 2+ on the right side and 2+ on the left side.  Psychiatric: Her speech is normal.     Neurologic Exam     Mental Status   Oriented to person, place, and time.   Registration of memory: Good recent and remote memory.   Attention: normal. Concentration: normal.   Speech: speech is normal   Level of consciousness: alert  Knowledge: consistent with education.     Cranial Nerves     CN II   Visual fields full to confrontation.   Visual acuity: normal    CN III, IV, VI   Pupils are equal, round, and reactive to light.  Extraocular motions are normal.     CN V   Facial sensation intact.   Right corneal reflex: normal  Left corneal reflex: normal    CN VII   Facial expression full, symmetric.   Right facial weakness: none  Left facial weakness: none    CN VIII   Hearing: intact    CN IX, X   Palate: symmetric    CN XI   Right sternocleidomastoid strength: normal  Left sternocleidomastoid strength: normal    CN XII   Tongue: not atrophic  Tongue deviation: none    Motor Exam   Muscle bulk: normal  Right arm tone: normal  Left arm tone: normal  Right leg tone: normal  Left leg tone: normal    Strength   Strength 5/5 except as noted.     Sensory Exam   Light touch normal.     Gait, Coordination, and Reflexes     Gait  Gait: normal    Coordination   Finger to nose coordination: normal  Heel to shin coordination: normal    Reflexes   Right brachioradialis: 2+  Left brachioradialis: 2+  Right biceps: 2+  Left biceps: 2+  Right triceps: 2+  Left triceps: 2+  Right patellar: 2+  Left patellar: 2+  Right achilles: 2+  Left achilles: 2+  Right : 2+  Left : 2+      Assessment/Plan   Independent Review of Radiographic Studies:    I reviewed the lumbar MRI done on 7/20/18 irritated did show  multiple levels of disc bulging at L3-L4, L4-L5, L5-S1.  However the most significant finding is at the level of S2 there is a perineural cyst or so-called Tarlov cyst versus an arachnoid cyst is putting quite a bit of mechanical pressure and displacing the S2 and possibly S3 nerve roots.  Is a little bit more prominent on the left.  Agree with the report.      Medical Decision Making:    Think she is one of the small majority of people who are symptomatic from a Tarlov cyst.  But I think the best approach for now treated nonoperatively by increasing her gabapentin to 300 mg 3 times a day.  We'll reassess her in 2 months.  I asked her to call in a month and let us know the dose changes helpful if not and if she is not having unwanted side effects and could raise the dose.      Tricia was seen today for back pain.    Diagnoses and all orders for this visit:    Tarlov cyst    Pain in the groin, left    DDD (degenerative disc disease), lumbar      Return in about 2 months (around 10/13/2018).

## 2018-09-28 ENCOUNTER — TELEPHONE (OUTPATIENT)
Dept: NEUROSURGERY | Facility: CLINIC | Age: 68
End: 2018-09-28

## 2018-09-28 NOTE — TELEPHONE ENCOUNTER
Patient has been advised to increase the Gabapentin and she will discuss how it has been working for her at her next visit.

## 2018-09-28 NOTE — TELEPHONE ENCOUNTER
Patient called regarding the gabapentin she has been taking for 6 weeks and still is having on and off pain in the buttocks and leg pain between both legs.  Patient has an appointment in mid October and was just calling with an update.

## 2018-09-28 NOTE — TELEPHONE ENCOUNTER
Patient called with an update regarding the Gabapentin. Do you want to increase it or discuss it when she comes in for her appt this month?

## 2018-10-16 NOTE — PROGRESS NOTES
Subjective   Patient ID: Tricia Cruz is a 68 y.o. female is here today for follow-up on back pain.    At the last visit the patient reported low back pain, pelvic pain, urinary urgency and numbness in bilateral feet. Her Gabapentin was increased to 300 mg TID at the last visit.    Today the patient reports that she has seen no changes with her symptoms even after the increase of the Gabapentin to 600 mg BID. She reports intermittent pain in the middle three toes on the left foot. She also complains of having an intermittent heavy feeling in her tailbone.    Back Pain   This is a chronic problem. The current episode started more than 1 month ago. The problem occurs daily. The problem is unchanged. The pain is present in the lumbar spine. Radiates to: left buttock and hip. Associated symptoms include leg pain, numbness, pelvic pain and tingling. Pertinent negatives include no bladder incontinence, bowel incontinence or perianal numbness.       The following portions of the patient's history were reviewed and updated as appropriate: allergies, current medications, past family history, past medical history, past social history, past surgical history and problem list.    Review of Systems   Gastrointestinal: Negative for bowel incontinence.   Genitourinary: Positive for pelvic pain and urgency. Negative for bladder incontinence.   Musculoskeletal: Positive for back pain.   Neurological: Positive for tingling and numbness.   All other systems reviewed and are negative.    I have been following this patient for a large, surprisingly symptomatic Tarlov cyst at about S2. It measures 3.1 x 2.0 x 2.6 cm. I think it is the source of her symptoms which include numbness and tingling in the labia on the right and sometimes the left, some numbness and tingling in the buttocks and tingling into the feet, a little bit of pain also. She has some low back pain which might be more related to her degenerative disk disease, but we have  been trying to avoid operating on the cyst. I told her that the success rate of operative intervention in my experience has only been about 50%; so we have been trying to manage her with gabapentin. She is up to 600 mg b.i.d. It has gradually been raised, and she does not notice a lot of difference; so I suggested that we go to 600 mg t.i.d. Again, she has not had side effects, at least for the time being. We will follow her and have her come back in 6 months.         Objective   Physical Exam   Constitutional: She is oriented to person, place, and time. She appears well-developed and well-nourished.   HENT:   Head: Normocephalic and atraumatic.   Eyes: Pupils are equal, round, and reactive to light. Conjunctivae and EOM are normal.   Fundoscopic exam:       The right eye shows no papilledema. The right eye shows venous pulsations.        The left eye shows no papilledema. The left eye shows venous pulsations.   Neck: Carotid bruit is not present.   Neurological: She is oriented to person, place, and time. She has a normal Finger-Nose-Finger Test and a normal Heel to Shin Test. Gait normal.   Reflex Scores:       Tricep reflexes are 2+ on the right side and 2+ on the left side.       Bicep reflexes are 2+ on the right side and 2+ on the left side.       Brachioradialis reflexes are 2+ on the right side and 2+ on the left side.       Patellar reflexes are 2+ on the right side and 2+ on the left side.       Achilles reflexes are 2+ on the right side and 2+ on the left side.  Psychiatric: Her speech is normal.     Neurologic Exam     Mental Status   Oriented to person, place, and time.   Registration of memory: Good recent and remote memory.   Attention: normal. Concentration: normal.   Speech: speech is normal   Level of consciousness: alert  Knowledge: consistent with education.     Cranial Nerves     CN II   Visual fields full to confrontation.   Visual acuity: normal    CN III, IV, VI   Pupils are equal, round, and  reactive to light.  Extraocular motions are normal.     CN V   Facial sensation intact.   Right corneal reflex: normal  Left corneal reflex: normal    CN VII   Facial expression full, symmetric.   Right facial weakness: none  Left facial weakness: none    CN VIII   Hearing: intact    CN IX, X   Palate: symmetric    CN XI   Right sternocleidomastoid strength: normal  Left sternocleidomastoid strength: normal    CN XII   Tongue: not atrophic  Tongue deviation: none    Motor Exam   Muscle bulk: normal  Right arm tone: normal  Left arm tone: normal  Right leg tone: normal  Left leg tone: normal    Strength   Strength 5/5 except as noted.     Sensory Exam   Light touch normal.     Gait, Coordination, and Reflexes     Gait  Gait: normal    Coordination   Finger to nose coordination: normal  Heel to shin coordination: normal    Reflexes   Right brachioradialis: 2+  Left brachioradialis: 2+  Right biceps: 2+  Left biceps: 2+  Right triceps: 2+  Left triceps: 2+  Right patellar: 2+  Left patellar: 2+  Right achilles: 2+  Left achilles: 2+  Right : 2+  Left : 2+      Assessment/Plan   Independent Review of Radiographic Studies:    I reviewed the lumbar MRI done 7/20/18 which shows a large Clayton off cyst measuring 3.1 x 2.0 x 2.6 cm at the level of S2.  There is some mild disc desiccation at L3-L4, L4-L5, L5-S1.  Agree with the report.      Medical Decision Making:    Again we'll treat her medically and increase the gabapentin to 600 mg 3 times a day.  I think every effort should be made towards not operating on this cyst as I think the operative intervention is only about 50% chance of helping.  We'll see her in 6 months.      Tricia was seen today for back pain.    Diagnoses and all orders for this visit:    Tarlov cyst    Pain in the groin, left    DDD (degenerative disc disease), lumbar    Other orders  -     gabapentin (NEURONTIN) 600 MG tablet; Take 1 tablet by mouth 3 (Three) Times a Day.      Return in about 6  months (around 4/17/2019).

## 2018-10-17 ENCOUNTER — OFFICE VISIT (OUTPATIENT)
Dept: NEUROSURGERY | Facility: CLINIC | Age: 68
End: 2018-10-17

## 2018-10-17 VITALS
HEART RATE: 98 BPM | SYSTOLIC BLOOD PRESSURE: 126 MMHG | DIASTOLIC BLOOD PRESSURE: 70 MMHG | BODY MASS INDEX: 18.64 KG/M2 | HEIGHT: 68 IN | WEIGHT: 123 LBS

## 2018-10-17 DIAGNOSIS — M51.36 DDD (DEGENERATIVE DISC DISEASE), LUMBAR: ICD-10-CM

## 2018-10-17 DIAGNOSIS — R10.32 PAIN IN THE GROIN, LEFT: ICD-10-CM

## 2018-10-17 DIAGNOSIS — G96.191 TARLOV CYST: Primary | ICD-10-CM

## 2018-10-17 PROCEDURE — 99214 OFFICE O/P EST MOD 30 MIN: CPT | Performed by: NEUROLOGICAL SURGERY

## 2018-10-17 RX ORDER — GABAPENTIN 600 MG/1
600 TABLET ORAL 3 TIMES DAILY
Qty: 90 TABLET | Refills: 6 | Status: SHIPPED | OUTPATIENT
Start: 2018-10-17 | End: 2018-12-21 | Stop reason: CLARIF

## 2018-11-14 ENCOUNTER — TELEPHONE (OUTPATIENT)
Dept: NEUROSURGERY | Facility: CLINIC | Age: 68
End: 2018-11-14

## 2018-11-14 NOTE — TELEPHONE ENCOUNTER
Patient says she saw a provider who recommended spinal decompression therapy to help with her pain. Patient wants to know if this is something Dr. Balderas thinks could help. Please advise.

## 2018-11-14 NOTE — TELEPHONE ENCOUNTER
Tell her because her leg pain is due to the Tarlov cyst and not a disc herniation, the spinal decompression technique will not work. Tell her to stick with the gabapentin and keep walking.

## 2018-12-04 ENCOUNTER — TELEPHONE (OUTPATIENT)
Dept: NEUROSURGERY | Facility: CLINIC | Age: 68
End: 2018-12-04

## 2018-12-04 ENCOUNTER — TELEPHONE (OUTPATIENT)
Dept: INTERNAL MEDICINE | Age: 68
End: 2018-12-04

## 2018-12-04 NOTE — TELEPHONE ENCOUNTER
[facility] called and would like an order faxed for PT for low back pain. She went there in July however she didn't complete the services.    Please fax: [phone]

## 2018-12-04 NOTE — TELEPHONE ENCOUNTER
"Patient states that she is having severe burning pain and tingling right foot. She is also having right pelvic/groin pain including her genital area.  She denies low back pain.  She states that her pain is constant and is a 4.  Gabapentin 600mg TID is not working, she would like to try a different \" nerve medication\", please advise.  "

## 2018-12-05 NOTE — TELEPHONE ENCOUNTER
Tell her try one more dose change with gabapentin and change to 600 mg qid. If that doesn't work, we'll try something else.

## 2018-12-06 DIAGNOSIS — M54.5 LOW BACK PAIN, UNSPECIFIED BACK PAIN LATERALITY, UNSPECIFIED CHRONICITY, WITH SCIATICA PRESENCE UNSPECIFIED: Primary | ICD-10-CM

## 2018-12-11 ENCOUNTER — HOSPITAL ENCOUNTER (OUTPATIENT)
Dept: PHYSICAL THERAPY | Facility: HOSPITAL | Age: 68
Setting detail: RECURRING SERIES
Discharge: HOME | End: 2018-12-31
Attending: INTERNAL MEDICINE | Admitting: INTERNAL MEDICINE

## 2018-12-17 NOTE — TELEPHONE ENCOUNTER
Cherie please advise, states that she now has labia numbness- increasing her Gabapentin has not helped

## 2018-12-17 NOTE — TELEPHONE ENCOUNTER
Spoke with patient, she does not want to see Dr RAMIREZ just yet as she is not interested in surgery.  Wants to know if there are any other meds besides Gabapentin she can try.  Informed patient that Dr RAMIREZ is out this week and it would be sometime next week that she would hear back.  Patient voiced understanding

## 2018-12-20 ENCOUNTER — OFFICE VISIT (OUTPATIENT)
Dept: INTERNAL MEDICINE | Age: 68
End: 2018-12-20

## 2018-12-20 VITALS
OXYGEN SATURATION: 98 % | DIASTOLIC BLOOD PRESSURE: 50 MMHG | RESPIRATION RATE: 13 BRPM | HEART RATE: 78 BPM | SYSTOLIC BLOOD PRESSURE: 110 MMHG | BODY MASS INDEX: 19.1 KG/M2 | TEMPERATURE: 97.6 F | WEIGHT: 126 LBS | HEIGHT: 68 IN

## 2018-12-20 DIAGNOSIS — R53.83 FATIGUE, UNSPECIFIED TYPE: ICD-10-CM

## 2018-12-20 DIAGNOSIS — E03.9 ACQUIRED HYPOTHYROIDISM: Primary | ICD-10-CM

## 2018-12-20 DIAGNOSIS — R30.0 DYSURIA: ICD-10-CM

## 2018-12-20 PROBLEM — K21.9 GASTROESOPHAGEAL REFLUX DISEASE WITHOUT ESOPHAGITIS: Status: ACTIVE | Noted: 2018-12-20

## 2018-12-20 LAB
APPEARANCE UR: CLEAR
BILIRUB UR QL STRIP: NEGATIVE
COLOR UR: YELLOW
GLUCOSE UR QL: NEGATIVE
HGB UR QL STRIP: NEGATIVE
KETONES UR QL STRIP: NEGATIVE
LEUKOCYTE ESTERASE UR QL STRIP: NEGATIVE
NITRITE UR QL STRIP: NEGATIVE
PH UR STRIP: 6.5 [PH] (ref 5–8)
PROT UR QL STRIP: NEGATIVE
SP GR UR: 1.02 (ref 1–1.03)
UROBILINOGEN UR STRIP-MCNC: NORMAL MG/DL

## 2018-12-20 PROCEDURE — 99214 OFFICE O/P EST MOD 30 MIN: CPT | Performed by: INTERNAL MEDICINE

## 2018-12-20 NOTE — PROGRESS NOTES
"  Tricia Cruz is a 68 y.o. female who presents with   Chief Complaint   Patient presents with   • Hypothyroidism     Low normal T4 on last testing 6 months ago.  Currently on no thyroid replacement medication but does complain of \"hair loss\"   • Fatigue   • Dysuria/frequency     No fever or chills   .    68-year-old female presents for her six-month checkup/lab update visit but spent the biggest bulk of her office visit time discussing about her low back pain, her Tarlov cyst problem, the fact that she sees Dr. Castrejon but is going to get a second opinion from a Dr. Alcocer of the [facility].  Also, The patient has not had a screening colonoscopy but she says she \"tried having one\" but apparently was unable to tolerate the prep.  The patient refuses to be referred back to gastroenterology (Dr. Chery) and to get a colonoscopy scheduled for the near future.  A COLOGUARD test was also declined.  The patient is aware of the risks of undiagnosed colon cancer including GI bleed, bowel obstruction, bowel perforation, metastatic colon cancer and death.  The patient voices an understanding of these risks but also voices a willingness to accept those risks based on the current decision to decline a colonoscopy.        Hypothyroidism   This is a chronic problem. The current episode started more than 1 month ago. The problem has been waxing and waning. Associated symptoms include fatigue. She has tried nothing for the symptoms.   Fatigue   This is a chronic problem. The current episode started more than 1 year ago. The problem has been waxing and waning. Associated symptoms include fatigue. She has tried nothing for the symptoms.        The following portions of the patient's history were reviewed and updated as appropriate: allergies, current medications, past medical history and problem list.    Review of Systems   Constitutional: Positive for fatigue.   HENT: Negative.    Eyes: Negative.    Respiratory: " "Negative.    Cardiovascular: Negative.    Genitourinary: Negative.    Musculoskeletal: Negative.    Skin: Negative.    Neurological: Negative.    Psychiatric/Behavioral: Negative.        Objective   Physical Exam   Constitutional: She is oriented to person, place, and time. She appears well-developed and well-nourished. No distress.   HENT:   Head: Normocephalic and atraumatic.   Eyes: Conjunctivae and EOM are normal. Pupils are equal, round, and reactive to light.   Neck: Normal range of motion. Neck supple. No thyromegaly present.   Neck exam negative.  Carotid auscultation normal-no bruits heard.   Cardiovascular: Normal rate, regular rhythm, normal heart sounds and intact distal pulses. Exam reveals no gallop and no friction rub.   No murmur heard.  Pulmonary/Chest: Effort normal and breath sounds normal. No respiratory distress. She has no wheezes. She has no rales. She exhibits no tenderness.   Musculoskeletal:   Chronic back pain as per history with cyst as noted above.  Also currently using a TENS unit and has been seen locally as well at \"[facility]\" for her back issue.   Neurological: She is alert and oriented to person, place, and time.   Psychiatric: She has a normal mood and affect. Her behavior is normal. Judgment and thought content normal.   Nursing note and vitals reviewed.      Assessment/Plan   Tricia was seen today for hypothyroidism, fatigue and dysuria/frequency.    Diagnoses and all orders for this visit:    Acquired hypothyroidism  -     Comprehensive Metabolic Panel  -     CBC & Differential  -     TSH+Free T4    Fatigue, unspecified type  -     Comprehensive Metabolic Panel  -     CBC & Differential  -     TSH+Free T4    Dysuria  -     Urinalysis With Microscopic If Indicated (No Culture) - Urine, Clean Catch        Plan: Labs as above for the issues as outlined.  Assuming today's labs are acceptable and we do not have to start her on thyroid medication then I will plan on seeing her " in one year for a checkup/lab update visit.  If she does have to start on thyroid medication then I would like to see her in 6 months for a checkup/lab update visit then.

## 2018-12-21 LAB
ALBUMIN SERPL-MCNC: 4.5 G/DL (ref 3.5–5.2)
ALBUMIN/GLOB SERPL: 2 G/DL
ALP SERPL-CCNC: 68 U/L (ref 39–117)
ALT SERPL-CCNC: 19 U/L (ref 1–33)
AST SERPL-CCNC: 19 U/L (ref 1–32)
BASOPHILS # BLD AUTO: 0.02 10*3/MM3 (ref 0–0.2)
BASOPHILS NFR BLD AUTO: 0.5 % (ref 0–1.5)
BILIRUB SERPL-MCNC: 0.5 MG/DL (ref 0.1–1.2)
BUN SERPL-MCNC: 23 MG/DL (ref 8–23)
BUN/CREAT SERPL: 22.5 (ref 7–25)
CALCIUM SERPL-MCNC: 9.9 MG/DL (ref 8.6–10.5)
CHLORIDE SERPL-SCNC: 103 MMOL/L (ref 98–107)
CO2 SERPL-SCNC: 29.8 MMOL/L (ref 22–29)
CREAT SERPL-MCNC: 1.02 MG/DL (ref 0.57–1)
EOSINOPHIL # BLD AUTO: 0.06 10*3/MM3 (ref 0–0.7)
EOSINOPHIL NFR BLD AUTO: 1.5 % (ref 0.3–6.2)
ERYTHROCYTE [DISTWIDTH] IN BLOOD BY AUTOMATED COUNT: 13.3 % (ref 11.7–13)
GLOBULIN SER CALC-MCNC: 2.3 GM/DL
GLUCOSE SERPL-MCNC: 86 MG/DL (ref 65–99)
HCT VFR BLD AUTO: 41.6 % (ref 35.6–45.5)
HGB BLD-MCNC: 13 G/DL (ref 11.9–15.5)
IMM GRANULOCYTES # BLD: 0 10*3/MM3 (ref 0–0.03)
IMM GRANULOCYTES NFR BLD: 0 % (ref 0–0.5)
LYMPHOCYTES # BLD AUTO: 1.12 10*3/MM3 (ref 0.9–4.8)
LYMPHOCYTES NFR BLD AUTO: 27.5 % (ref 19.6–45.3)
MCH RBC QN AUTO: 31.7 PG (ref 26.9–32)
MCHC RBC AUTO-ENTMCNC: 31.3 G/DL (ref 32.4–36.3)
MCV RBC AUTO: 101.5 FL (ref 80.5–98.2)
MONOCYTES # BLD AUTO: 0.59 10*3/MM3 (ref 0.2–1.2)
MONOCYTES NFR BLD AUTO: 14.5 % (ref 5–12)
NEUTROPHILS # BLD AUTO: 2.28 10*3/MM3 (ref 1.9–8.1)
NEUTROPHILS NFR BLD AUTO: 56 % (ref 42.7–76)
PLATELET # BLD AUTO: 139 10*3/MM3 (ref 140–500)
POTASSIUM SERPL-SCNC: 4.3 MMOL/L (ref 3.5–5.2)
PROT SERPL-MCNC: 6.8 G/DL (ref 6–8.5)
RBC # BLD AUTO: 4.1 10*6/MM3 (ref 3.9–5.2)
SODIUM SERPL-SCNC: 143 MMOL/L (ref 136–145)
T4 FREE SERPL-MCNC: 0.95 NG/DL (ref 0.93–1.7)
TSH SERPL DL<=0.005 MIU/L-ACNC: 4.04 MIU/ML (ref 0.27–4.2)
WBC # BLD AUTO: 4.07 10*3/MM3 (ref 4.5–10.7)

## 2018-12-21 RX ORDER — LEVOTHYROXINE SODIUM 88 UG/1
88 TABLET ORAL DAILY
Qty: 30 TABLET | Refills: 5 | Status: SHIPPED | OUTPATIENT
Start: 2018-12-21 | End: 2019-06-18 | Stop reason: SDUPTHER

## 2018-12-21 NOTE — TELEPHONE ENCOUNTER
Cherie,please sign off on new script- Dr RAMIREZ has increased her Gabapentin 600mg to QID.      Spoke with pharmacist, her insurance will only pay for an 84 day supply- if she is taking it QID need to write script for #[phone] with no refills

## 2018-12-21 NOTE — PROGRESS NOTES
Thyroid level (T4) remains on the lower end of normal at 0.95 which is a slight decline from 6 months ago.  Given this I am going to go ahead and start low dose thyroid replacement-levothyroxin 88 micrograms -1 tablet every morning.  All other labs are within acceptable range.  I would suggest that we go ahead and see you in 6 months rather than 1 year for a checkup/lab update visit to reassess blood thyroid levels.

## 2018-12-27 RX ORDER — GABAPENTIN 300 MG/1
CAPSULE ORAL
Qty: 336 CAPSULE | Refills: 0 | OUTPATIENT
Start: 2018-12-27 | End: 2019-04-17

## 2018-12-28 RX ORDER — TOPIRAMATE 25 MG/1
25 TABLET ORAL 2 TIMES DAILY
Qty: 60 TABLET | Refills: 0 | Status: SHIPPED | OUTPATIENT
Start: 2018-12-28 | End: 2019-04-17

## 2018-12-28 NOTE — TELEPHONE ENCOUNTER
Informed patient that as per Dr RAMIREZ she can try Topamax 25 BID, she will need to wean off the Gabapentin 600mg, per Dr RAMIREZ she needs to take Gabapentin 600mg BID x 1 week then 600mg qd x 1 week then stop.  She can start the Topamax when she is down to 600mg qd.    Patient voiced understanding and read directions back to me

## 2019-02-22 ENCOUNTER — TELEPHONE (OUTPATIENT)
Dept: NEUROSURGERY | Facility: CLINIC | Age: 69
End: 2019-02-22

## 2019-02-22 DIAGNOSIS — M79.606 LOW BACK PAIN RADIATING TO LOWER EXTREMITY: ICD-10-CM

## 2019-02-22 DIAGNOSIS — M54.50 LOW BACK PAIN RADIATING TO LOWER EXTREMITY: ICD-10-CM

## 2019-02-22 DIAGNOSIS — G96.191 TARLOV CYST: Primary | ICD-10-CM

## 2019-02-22 NOTE — TELEPHONE ENCOUNTER
She does not want to increase the medication, does not feel it will help, wants updated imaging and/or follow up with you.    'Do you want to get new MRI and see what it shows and see if we need to see her before her appt in April, or move up her appt and you see her first?

## 2019-02-22 NOTE — TELEPHONE ENCOUNTER
She is taking Gabapentin 600 mg TID and is not helping , she has increased bilateral leg and pelvis numbness and burning.  Has appt in April.  She does not want to try Topamax due to side affects.  Her last imaging was in July 2018.    Do you want her to have a new MRI prior to appt or have her see Cherie/Nadine to evaluated her and order appropriate testing etc..      Please advise.  Pt informed Dr RAMIREZ in surgery, will not have answer until Monday

## 2019-02-25 NOTE — TELEPHONE ENCOUNTER
Tell her we can get a new noncontrast lumbar MRI, but I'm frankly not very optimistic that a surgery help her. I would suggest she see Dr. Sherwood after the MRI to see if he is more optimistic about the results of surgery for a Tarlov cyst, which is her diagnosis.

## 2019-02-26 NOTE — TELEPHONE ENCOUNTER
"Dr RAMIREZ, spoke with pt, she and her  think that she needs both a lumbar spine and pelvic MRI as her pain is \"very low\", do you want to order both?  "

## 2019-02-27 NOTE — TELEPHONE ENCOUNTER
Spoke with pt and informed her that Dr RAMIREZ will order both the lumbar and pelvis MRI without contrast.  Pt wants to got to [facility] to have done due to cost.    Patient asked that we send order to [facility] and she will call them to schedule around her schedule.  I told her that she needs to schedule it out for at least 7-10 days so Marlon has time to get a precert    Order faxed to [facility] for both

## 2019-03-19 ENCOUNTER — TELEPHONE (OUTPATIENT)
Dept: NEUROSURGERY | Facility: CLINIC | Age: 69
End: 2019-03-19

## 2019-03-19 NOTE — TELEPHONE ENCOUNTER
Informed pt that her MRI has been denied per Marlon because she has not been re-evaluated in 6 months.  She will keep her April appt and if Dr RAMIREZ still wants MRI at that time then we can try again

## 2019-04-15 NOTE — PROGRESS NOTES
Subjective   Patient ID: Tricia Cruz is a 69 y.o. female is here today for follow-up.  Patient was last seen 10.17.2018 for low back,left buttock and hip pain.  She did see a chiropractor recently and had plain films there.    Patient has called a couple times since then with increased pain and we tried to order an updated MRI and her insurance denied it because she has not been evaluated in 6 months.  Her Gabapentin was increased to 600 mg TID which is not helping, she did not want to increase it any further due to side affects.    Patient is currently having constant moderate pelvic pain, bilateral buttock pain, bilateral posterior leg pain , R > L, burning pain groin and vaginal area.  She denies leg weakness, but her legs sometimes feel heavy.   No back pain.  She did see her gynecologist who ruled out any problems.  No N/T.    She could not tolerate Gabapentin 600 mg TID so she backed back down to 300mg TID     She is with her . She has a very large S2 Tarlov cyst, which I think is asymptomatic, causing some numbness and tingling in the groin areas and occasional pain down the right buttock and leg, but this results in difficulty walking and some pelvic symptoms even. Her gynecologist ruled out any intrinsic pelvic problems. I think this probably is coming from her Tarlov cyst, and we have been trying to treat it nonoperatively, largely because my own experience with operated Tarlov cysts is that it is not likely to be helpful. I have done 2 in my career over the last 24 years and neither one of them were helped by the surgery, plus they required reoperations for CSF leaks, but she seems to be running out of options. She could not tolerate the increase in the dose of gabapentin to 600 mg 4 times a day, so we will keep it at 3 times a day. She is seriously considering moving forward with surgery. I suggested that she see one of my colleagues, Dr. Sherwood. She also has an appointment with Dino Bennett,  MD, in [place], whom I know personally. I even suggested that she perhaps go to [facility] to see Kole Galeana MD. We will hold off on the latter for now. We will get a new MRI since her symptoms have worsened and we are considering surgery and send her to Dr. Sherwood. I will see her after that and after she sees Dr. Bennett to discuss.      Leg Pain    The pain is present in the left leg and right leg. The pain is at a severity of 5/10. The pain is moderate. The pain has been intermittent since onset. Pertinent negatives include no numbness or tingling.       The following portions of the patient's history were reviewed and updated as appropriate: allergies, current medications, past family history, past medical history, past social history, past surgical history and problem list.    Review of Systems   Musculoskeletal: Negative for arthralgias, back pain, gait problem and myalgias.   Neurological: Negative for tingling, weakness and numbness.   All other systems reviewed and are negative.      Objective   Physical Exam   Constitutional: She is oriented to person, place, and time. She appears well-developed and well-nourished.   HENT:   Head: Normocephalic and atraumatic.   Eyes: Conjunctivae and EOM are normal. Pupils are equal, round, and reactive to light.   Fundoscopic exam:       The right eye shows no papilledema. The right eye shows venous pulsations.        The left eye shows no papilledema. The left eye shows venous pulsations.   Neck: Carotid bruit is not present.   Neurological: She is oriented to person, place, and time. She has a normal Finger-Nose-Finger Test and a normal Heel to Shin Test. Gait normal.   Reflex Scores:       Tricep reflexes are 2+ on the right side and 2+ on the left side.       Bicep reflexes are 2+ on the right side and 2+ on the left side.       Brachioradialis reflexes are 2+ on the right side and 2+ on the left side.       Patellar reflexes are 2+ on the right side and  2+ on the left side.       Achilles reflexes are 2+ on the right side and 2+ on the left side.  Psychiatric: Her speech is normal.     Neurologic Exam     Mental Status   Oriented to person, place, and time.   Registration of memory: Good recent and remote memory.   Attention: normal. Concentration: normal.   Speech: speech is normal   Level of consciousness: alert  Knowledge: consistent with education.     Cranial Nerves     CN II   Visual fields full to confrontation.   Visual acuity: normal    CN III, IV, VI   Pupils are equal, round, and reactive to light.  Extraocular motions are normal.     CN V   Facial sensation intact.   Right corneal reflex: normal  Left corneal reflex: normal    CN VII   Facial expression full, symmetric.   Right facial weakness: none  Left facial weakness: none    CN VIII   Hearing: intact    CN IX, X   Palate: symmetric    CN XI   Right sternocleidomastoid strength: normal  Left sternocleidomastoid strength: normal    CN XII   Tongue: not atrophic  Tongue deviation: none    Motor Exam   Muscle bulk: normal  Right arm tone: normal  Left arm tone: normal  Right leg tone: normal  Left leg tone: normal    Strength   Strength 5/5 except as noted.     Sensory Exam   Light touch normal.     Gait, Coordination, and Reflexes     Gait  Gait: normal    Coordination   Finger to nose coordination: normal  Heel to shin coordination: normal    Reflexes   Right brachioradialis: 2+  Left brachioradialis: 2+  Right biceps: 2+  Left biceps: 2+  Right triceps: 2+  Left triceps: 2+  Right patellar: 2+  Left patellar: 2+  Right achilles: 2+  Left achilles: 2+  Right : 2+  Left : 2+      Assessment/Plan   Independent Review of Radiographic Studies:    Reviewed the lumbar MRI done in July 2018 which showed a large Tarlov cyst measuring 3.1 x 2.0 x 2.6 cm at S2.  There is some mild disc desiccation at L3-L4, L4-L5, and L5-S1.  Agree with the report.      Medical Decision Making:    We will go ahead and  refer her to Dr. Sherwood and to get a new MRI of the lumbar spine.  I renewed her gabapentin at 600 mg 3 times daily.  She will come back to see me after seeing Dr. Sherwood and after seeing Dr. Bennett in [place].      Tricia was seen today for leg pain.    Diagnoses and all orders for this visit:    Tarlov cyst  -     Ambulatory Referral to Neurosurgery  -     MRI Lumbar Spine Without Contrast; Future    Low back pain radiating to lower extremity    Pain in the groin, left    Other orders  -     gabapentin (NEURONTIN) 600 MG tablet; Take 1 tablet by mouth 3 (Three) Times a Day.      Return in about 3 months (around 7/17/2019) for after she sees Dr. Sherwood and Dr. Bennett in [place].

## 2019-04-17 ENCOUNTER — OFFICE VISIT (OUTPATIENT)
Dept: NEUROSURGERY | Facility: CLINIC | Age: 69
End: 2019-04-17

## 2019-04-17 VITALS
SYSTOLIC BLOOD PRESSURE: 118 MMHG | HEIGHT: 68 IN | WEIGHT: 126 LBS | DIASTOLIC BLOOD PRESSURE: 72 MMHG | BODY MASS INDEX: 19.1 KG/M2 | HEART RATE: 74 BPM

## 2019-04-17 DIAGNOSIS — M54.50 LOW BACK PAIN RADIATING TO LOWER EXTREMITY: ICD-10-CM

## 2019-04-17 DIAGNOSIS — R10.32 PAIN IN THE GROIN, LEFT: ICD-10-CM

## 2019-04-17 DIAGNOSIS — G96.191 TARLOV CYST: Primary | ICD-10-CM

## 2019-04-17 DIAGNOSIS — M79.606 LOW BACK PAIN RADIATING TO LOWER EXTREMITY: ICD-10-CM

## 2019-04-17 PROCEDURE — 99214 OFFICE O/P EST MOD 30 MIN: CPT | Performed by: NEUROLOGICAL SURGERY

## 2019-04-17 RX ORDER — GABAPENTIN 600 MG/1
600 TABLET ORAL 3 TIMES DAILY
Qty: 90 TABLET | Refills: 5 | Status: SHIPPED | OUTPATIENT
Start: 2019-04-17 | End: 2019-07-08 | Stop reason: SDUPTHER

## 2019-05-01 ENCOUNTER — HOSPITAL ENCOUNTER (OUTPATIENT)
Dept: GENERAL RADIOLOGY | Facility: HOSPITAL | Age: 69
Discharge: HOME | End: 2019-05-01
Admitting: NEUROLOGICAL SURGERY

## 2019-05-01 DIAGNOSIS — M54.5 LOW BACK PAIN, UNSPECIFIED BACK PAIN LATERALITY, UNSPECIFIED CHRONICITY, WITH SCIATICA PRESENCE UNSPECIFIED: ICD-10-CM

## 2019-05-01 DIAGNOSIS — M54.16 LUMBAR RADICULOPATHY: ICD-10-CM

## 2019-05-01 PROCEDURE — 72110 X-RAY EXAM L-2 SPINE 4/>VWS: CPT

## 2019-05-03 DIAGNOSIS — G96.191 TARLOV CYST: ICD-10-CM

## 2019-06-18 RX ORDER — LEVOTHYROXINE SODIUM 88 UG/1
TABLET ORAL
Qty: 90 TABLET | Refills: 1 | Status: SHIPPED | OUTPATIENT
Start: 2019-06-18 | End: 2019-11-22 | Stop reason: SDUPTHER

## 2019-06-28 ENCOUNTER — OFFICE VISIT (OUTPATIENT)
Dept: INTERNAL MEDICINE | Age: 69
End: 2019-06-28

## 2019-06-28 VITALS
HEART RATE: 67 BPM | DIASTOLIC BLOOD PRESSURE: 68 MMHG | RESPIRATION RATE: 13 BRPM | TEMPERATURE: 98 F | SYSTOLIC BLOOD PRESSURE: 120 MMHG | HEIGHT: 68 IN | WEIGHT: 121 LBS | BODY MASS INDEX: 18.34 KG/M2 | OXYGEN SATURATION: 99 %

## 2019-06-28 DIAGNOSIS — R10.32 PAIN IN THE GROIN, LEFT: ICD-10-CM

## 2019-06-28 DIAGNOSIS — E03.9 ACQUIRED HYPOTHYROIDISM: Primary | ICD-10-CM

## 2019-06-28 LAB
ALBUMIN SERPL-MCNC: 4.5 G/DL (ref 3.5–5.2)
ALBUMIN/GLOB SERPL: 2.1 G/DL
ALP SERPL-CCNC: 78 U/L (ref 39–117)
ALT SERPL-CCNC: 21 U/L (ref 1–33)
APPEARANCE UR: CLEAR
AST SERPL-CCNC: 21 U/L (ref 1–32)
BILIRUB SERPL-MCNC: 0.7 MG/DL (ref 0.2–1.2)
BILIRUB UR QL STRIP: NEGATIVE
BUN SERPL-MCNC: 25 MG/DL (ref 8–23)
BUN/CREAT SERPL: 25.3 (ref 7–25)
CALCIUM SERPL-MCNC: 10 MG/DL (ref 8.6–10.5)
CHLORIDE SERPL-SCNC: 103 MMOL/L (ref 98–107)
CHOLEST SERPL-MCNC: 180 MG/DL (ref 0–200)
CO2 SERPL-SCNC: 29.5 MMOL/L (ref 22–29)
COLOR UR: YELLOW
CREAT SERPL-MCNC: 0.99 MG/DL (ref 0.57–1)
GLOBULIN SER CALC-MCNC: 2.1 GM/DL
GLUCOSE SERPL-MCNC: 87 MG/DL (ref 65–99)
GLUCOSE UR QL: NEGATIVE
HDLC SERPL-MCNC: 99 MG/DL (ref 40–60)
HGB UR QL STRIP: NEGATIVE
KETONES UR QL STRIP: NEGATIVE
LDLC SERPL CALC-MCNC: 73 MG/DL (ref 0–100)
LEUKOCYTE ESTERASE UR QL STRIP: NEGATIVE
NITRITE UR QL STRIP: NEGATIVE
PH UR STRIP: 6.5 [PH] (ref 5–8)
POTASSIUM SERPL-SCNC: 4.9 MMOL/L (ref 3.5–5.2)
PROT SERPL-MCNC: 6.6 G/DL (ref 6–8.5)
PROT UR QL STRIP: NEGATIVE
SODIUM SERPL-SCNC: 143 MMOL/L (ref 136–145)
SP GR UR: 1.02 (ref 1–1.03)
T4 FREE SERPL-MCNC: 1.65 NG/DL (ref 0.93–1.7)
TRIGL SERPL-MCNC: 39 MG/DL (ref 0–150)
TSH SERPL DL<=0.005 MIU/L-ACNC: 0.02 MIU/ML (ref 0.27–4.2)
UROBILINOGEN UR STRIP-MCNC: NORMAL MG/DL
VLDLC SERPL CALC-MCNC: 7.8 MG/DL

## 2019-06-28 PROCEDURE — 99214 OFFICE O/P EST MOD 30 MIN: CPT | Performed by: INTERNAL MEDICINE

## 2019-06-28 NOTE — PROGRESS NOTES
Tricia Cruz is a 69 y.o. female who presents with   Chief Complaint   Patient presents with   • Hypothyroidism     Levothyroxine 88 mcg daily   • Groin pain-history of     Urinalysis requested   .    69-year-old female.  6-month checkup/lab update visit.  No specific complaints on today's visit.  She is requesting a urinalysis in addition to her thyroid levels.      Hypothyroidism   This is a chronic problem. The current episode started more than 1 year ago. The problem has been unchanged. Treatments tried: Treatment as listed above.  Well-tolerated.        The following portions of the patient's history were reviewed and updated as appropriate: allergies, current medications, past medical history and problem list.    Review of Systems   Constitutional: Negative.    HENT: Negative.    Eyes: Negative.    Respiratory: Negative.    Cardiovascular: Negative.    Genitourinary: Negative.    Musculoskeletal: Negative.    Skin: Negative.    Neurological: Negative.    Psychiatric/Behavioral: Negative.        Objective   Physical Exam   Constitutional: She is oriented to person, place, and time. She appears well-developed and well-nourished. No distress.   HENT:   Head: Normocephalic and atraumatic.   Eyes: Conjunctivae and EOM are normal. Pupils are equal, round, and reactive to light.   Neck: Normal range of motion. Neck supple. No thyromegaly present.   Neck exam negative.  Carotid auscultation normal-no bruits heard.   Cardiovascular: Normal rate, regular rhythm, normal heart sounds and intact distal pulses. Exam reveals no gallop and no friction rub.   No murmur heard.  Pulmonary/Chest: Effort normal and breath sounds normal. No respiratory distress. She has no wheezes. She has no rales. She exhibits no tenderness.   Neurological: She is alert and oriented to person, place, and time.   Psychiatric: She has a normal mood and affect. Her behavior is normal. Judgment and thought content normal.   Nursing note and  vitals reviewed.      Assessment/Plan   Tricia was seen today for hypothyroidism and groin pain-history of.    Diagnoses and all orders for this visit:    Acquired hypothyroidism  -     Comprehensive Metabolic Panel  -     Lipid Panel  -     TSH+Free T4  -     Urinalysis With Microscopic If Indicated (No Culture) - Urine, Clean Catch    Pain in the groin, left  -     Comprehensive Metabolic Panel  -     Lipid Panel  -     Urinalysis With Microscopic If Indicated (No Culture) - Urine, Clean Catch      Plan: Labs as above.Today's exam unremarkable for any new problems or events.  Continue all current treatment as prescribed.  A follow-up checkup/lab update visit is advised for 6 months assuming today's labs are all acceptable.

## 2019-07-01 ENCOUNTER — TELEPHONE (OUTPATIENT)
Dept: INTERNAL MEDICINE | Age: 69
End: 2019-07-01

## 2019-07-01 NOTE — TELEPHONE ENCOUNTER
Pt states last TSH was 4.040, today 0.023  She would like to know if she needs to adjust thyroid med.  Please advise. MM

## 2019-07-01 NOTE — TELEPHONE ENCOUNTER
Patient received her labs results but she was looking at her TSH. She is not understanding the results and not sure what she should do if she should decrease or increase a med. Please call patient at  [phone]. If pt does not answer she said you can leave a message.

## 2019-07-01 NOTE — TELEPHONE ENCOUNTER
No.  The lower TSH suggests and indicates that the current levothyroxine dose is adequate.  Continue treatment as prescribed.  If she wants to discuss it in a get a face-to-face discussion then make her an appointment

## 2019-07-08 RX ORDER — GABAPENTIN 600 MG/1
600 TABLET ORAL 3 TIMES DAILY
Qty: 90 TABLET | Refills: 5 | Status: SHIPPED | OUTPATIENT
Start: 2019-07-08 | End: 2019-10-28

## 2019-07-08 NOTE — TELEPHONE ENCOUNTER
Patient called for a refill on her Gabapentin 600 mg 1 po TID. Pharmacy was verified and is correct. I told her it might be the end of the day before anything is sent to her pharmacy as Dr. Balderas is seeing patient's and he will need to approve her refill. She has a follow up appointment on 7/17/2019 with Dr. Balderas.

## 2019-07-09 NOTE — TELEPHONE ENCOUNTER
Patient called and stated that she was told by pharmacy that she is unable to get refill until Sunday, July 14th.     I called pharmacy and spoke with Caryn who is a pharmacy tech. She stated that it was to soon for the medication to be refilled so insurance will not pay for it. This RX was last refilled on 04/17/19 and she was giving #270 and that means she should still have 7 pills left.    I spoke with patient and she stated that she took some extra because she use to take them QID. Patient verbally understood and will wait for Sunday to get refill.

## 2019-07-13 NOTE — PROGRESS NOTES
Subjective   Patient ID: Tricia Cruz is a 69 y.o. female is here today for follow-up after evaluation with Dr Sherwood and Donald  Patient had lumbar spine MRI 5.1.19 at [facility]    Patient saw Dr Sherwood 5.2.19 who recommended a CT myelogram which she has not scheduled.  She does not have follow up scheduled.    She saw Dr Bennett in [place] on 5.21.19 and he also recommended CT myelogram which she has not scheduled.  She does not have follow up scheduled.    Patient was last seen 4.17.19 for constant pelvic pain, bilateral buttock pain,right  posterior leg pain, burning groin and vagina area which is unchanged.  She has had recent new symptom of right buttock burning pain intermittently    She is taking Gabapentin 600 mg TID    Since I saw her, she went to see Dr. Dino Bennett in [place], as well as Dr. Sherwood. They both agreed about the potential for a direct surgery to repair the Tarlov cyst being feasible, but they both suggested doing a CT myelogram to see if the cyst filled. I do not think that is unreasonable. The patient asked my opinion about it. I told them if she wanted to have the myelogram, I could order it. My own experience with Tarlov cysts has not been all that successful, so I told her that even if it did fill, she might want to pursue treatment either with Dr. Bennett or Dr. Sherwood, whose experience might be somewhat better. The only other option is to continue living with it and taking the gabapentin at 600 mg t.i.d., which I have given to her. She indicated that she wanted to follow up with Dr. Bennett if she got the myelogram, but she has not decided if she wanted a myelogram. She is aware the risk of a spinal headache and the potential need for a blood patch. Will sit tight for now, and she will let us know if she wants to pursue the myelogram of the lumbar region, and will order it if she wants to. In the meantime, we will have her come back in 3 months. Dr. Bennett did mention the  possibility of trying spinal cord stimulation, which I think actually is very reasonable in the situation in which she tries the primary surgery for the Tarlov cyst and it does not help. She also needed to be reminded that there can be complications such as CSF leakage when repairing Tarlov cysts.       Leg Pain    The pain is present in the right leg. The pain is at a severity of 10/10. The pain is severe. The pain has been constant since onset. Associated symptoms include numbness.       The following portions of the patient's history were reviewed and updated as appropriate: allergies, current medications, past family history, past medical history, past social history, past surgical history and problem list.    Review of Systems   Musculoskeletal: Negative for gait problem.   Neurological: Positive for numbness. Negative for weakness.   All other systems reviewed and are negative.      Objective   Physical Exam   Constitutional: She is oriented to person, place, and time. She appears well-developed and well-nourished.   HENT:   Head: Normocephalic and atraumatic.   Eyes: Conjunctivae and EOM are normal. Pupils are equal, round, and reactive to light.   Fundoscopic exam:       The right eye shows no papilledema. The right eye shows venous pulsations.        The left eye shows no papilledema. The left eye shows venous pulsations.   Neck: Carotid bruit is not present.   Neurological: She is oriented to person, place, and time. She has a normal Finger-Nose-Finger Test and a normal Heel to Shin Test. Gait normal.   Reflex Scores:       Tricep reflexes are 2+ on the right side and 2+ on the left side.       Bicep reflexes are 2+ on the right side and 2+ on the left side.       Brachioradialis reflexes are 2+ on the right side and 2+ on the left side.       Patellar reflexes are 2+ on the right side and 2+ on the left side.       Achilles reflexes are 2+ on the right side and 2+ on the left side.  Psychiatric: Her  speech is normal.     Neurologic Exam     Mental Status   Oriented to person, place, and time.   Registration of memory: Good recent and remote memory.   Attention: normal. Concentration: normal.   Speech: speech is normal   Level of consciousness: alert  Knowledge: consistent with education.     Cranial Nerves     CN II   Visual fields full to confrontation.   Visual acuity: normal    CN III, IV, VI   Pupils are equal, round, and reactive to light.  Extraocular motions are normal.     CN V   Facial sensation intact.   Right corneal reflex: normal  Left corneal reflex: normal    CN VII   Facial expression full, symmetric.   Right facial weakness: none  Left facial weakness: none    CN VIII   Hearing: intact    CN IX, X   Palate: symmetric    CN XI   Right sternocleidomastoid strength: normal  Left sternocleidomastoid strength: normal    CN XII   Tongue: not atrophic  Tongue deviation: none    Motor Exam   Muscle bulk: normal  Right arm tone: normal  Left arm tone: normal  Right leg tone: normal  Left leg tone: normal    Strength   Strength 5/5 except as noted.     Sensory Exam   Light touch normal.     Gait, Coordination, and Reflexes     Gait  Gait: normal    Coordination   Finger to nose coordination: normal  Heel to shin coordination: normal    Reflexes   Right brachioradialis: 2+  Left brachioradialis: 2+  Right biceps: 2+  Left biceps: 2+  Right triceps: 2+  Left triceps: 2+  Right patellar: 2+  Left patellar: 2+  Right achilles: 2+  Left achilles: 2+  Right : 2+  Left : 2+      Assessment/Plan   Independent Review of Radiographic Studies:    I reviewed the most recent MRI that was done on 5/1/2019 which shows the same Tarlov cyst and mild disc bulging and facet arthropathy.  Agree with the report.      Medical Decision Making:    She will let us know she wants to pursue the myelogram and if so we can order it and she can follow-up with Dr. Bennett in [place].  In the meantime since I have given her  gabapentin will make a follow-up visit in about 3 months.      Tricia was seen today for leg pain and vaginal pain.    Diagnoses and all orders for this visit:    Tarlov cyst    Low back pain radiating to lower extremity    DDD (degenerative disc disease), lumbar      Return in about 3 months (around 10/17/2019).

## 2019-07-15 ENCOUNTER — TELEPHONE (OUTPATIENT)
Dept: INTERNAL MEDICINE | Age: 69
End: 2019-07-15

## 2019-07-15 NOTE — TELEPHONE ENCOUNTER
Pt called and wanting to talk to dr. marshall about her Tsh results. She wanted to if she should really continue her meds since the number is really low. She said she is concerned.    Pls advise,    PT#[phone]

## 2019-07-15 NOTE — TELEPHONE ENCOUNTER
I have already been through this once.  The low TSH means that the current thyroid medication is keeping the current thyroid level  (T4) normal and therefore not as much TSH has to be produced by the pituitary in the brain.  This is the desired effect of medication.  If she cannot seem to understand this and has further questions then make her an appointment for a face-to-face office visit

## 2019-07-16 NOTE — TELEPHONE ENCOUNTER
Pt was advised of the medical insight r/t thyroid and TSH. Pt stated that she understood.  Pt was advised to call for an appointment for further concerns. GONZALO

## 2019-07-17 ENCOUNTER — OFFICE VISIT (OUTPATIENT)
Dept: NEUROSURGERY | Facility: CLINIC | Age: 69
End: 2019-07-17

## 2019-07-17 VITALS
BODY MASS INDEX: 18.34 KG/M2 | SYSTOLIC BLOOD PRESSURE: 112 MMHG | HEIGHT: 68 IN | WEIGHT: 121 LBS | HEART RATE: 74 BPM | DIASTOLIC BLOOD PRESSURE: 72 MMHG

## 2019-07-17 DIAGNOSIS — M79.606 LOW BACK PAIN RADIATING TO LOWER EXTREMITY: ICD-10-CM

## 2019-07-17 DIAGNOSIS — M54.50 LOW BACK PAIN RADIATING TO LOWER EXTREMITY: ICD-10-CM

## 2019-07-17 DIAGNOSIS — M51.36 DDD (DEGENERATIVE DISC DISEASE), LUMBAR: ICD-10-CM

## 2019-07-17 DIAGNOSIS — G96.191 TARLOV CYST: Primary | ICD-10-CM

## 2019-07-17 PROCEDURE — 99214 OFFICE O/P EST MOD 30 MIN: CPT | Performed by: NEUROLOGICAL SURGERY

## 2019-08-02 ENCOUNTER — TELEPHONE (OUTPATIENT)
Dept: NEUROSURGERY | Facility: CLINIC | Age: 69
End: 2019-08-02

## 2019-08-02 NOTE — TELEPHONE ENCOUNTER
Patient states that she was told at check out at her last office appt that someone would mail her her office notes and she has not rec'd.    I mailed her the 4 office notes that we have to her home address at her request

## 2019-10-11 NOTE — PROGRESS NOTES
Subjective   Patient ID: Tricia Cruz is a 69 y.o. female is here today for follow-up.  She did not go back and see Dr Bennett in [place], she has been dealing with her symptoms, however, they are worsening.     Patient was last seen 7.17.19 for constant pelvic pain, bilateral buttock pain, right posterior leg and vaginal pain and numbness.  Patient states that numbness in her right foot and burning pain right heal has worsened since her last visit.  She now also has left hip pain.    She is taking Gabapentin 600 mg TID and it does not notice that is helps much.  She is taking ( 2) am and the third dose in the evening.    I saw her last about 6 months ago. She decided not to pursue the myelogram, as she has become less enthusiastic about undergoing surgery because of the known rather marginal success rate. She does not think the 600 mg t.i.d. of gabapentin is enough so will simply raise it some more. Will have her come back in 6 months. If she wants to pursue surgical considerations with Dr. Sherwood and with Dr. Bennett , then she will need a myelogram which she did not get. Spinal cord stimulation has been brought up and, frankly, not a bad option but I would not do that at the present time without trying to maximize medical therapy.      Leg Pain    The pain is present in the right leg, left hip and right heel. The pain is at a severity of 5/10. The pain is moderate. The pain has been constant since onset. Associated symptoms include numbness and tingling.       The following portions of the patient's history were reviewed and updated as appropriate: allergies, current medications, past family history, past medical history, past social history, past surgical history and problem list.    Review of Systems   Genitourinary: Positive for pelvic pain and vaginal pain.   Musculoskeletal: Negative for arthralgias, back pain, gait problem and myalgias.   Neurological: Positive for tingling and numbness. Negative for  weakness.   All other systems reviewed and are negative.      Objective   Physical Exam   Constitutional: She is oriented to person, place, and time. She appears well-developed and well-nourished.   HENT:   Head: Normocephalic and atraumatic.   Eyes: Conjunctivae and EOM are normal. Pupils are equal, round, and reactive to light.   Fundoscopic exam:       The right eye shows no papilledema. The right eye shows venous pulsations.        The left eye shows no papilledema. The left eye shows venous pulsations.   Neck: Carotid bruit is not present.   Neurological: She is oriented to person, place, and time. She has a normal Finger-Nose-Finger Test and a normal Heel to Shin Test. Gait normal.   Reflex Scores:       Tricep reflexes are 2+ on the right side and 2+ on the left side.       Bicep reflexes are 2+ on the right side and 2+ on the left side.       Brachioradialis reflexes are 2+ on the right side and 2+ on the left side.       Patellar reflexes are 2+ on the right side and 2+ on the left side.       Achilles reflexes are 2+ on the right side and 2+ on the left side.  Psychiatric: Her speech is normal.     Neurologic Exam     Mental Status   Oriented to person, place, and time.   Registration of memory: Good recent and remote memory.   Attention: normal. Concentration: normal.   Speech: speech is normal   Level of consciousness: alert  Knowledge: consistent with education.     Cranial Nerves     CN II   Visual fields full to confrontation.   Visual acuity: normal    CN III, IV, VI   Pupils are equal, round, and reactive to light.  Extraocular motions are normal.     CN V   Facial sensation intact.   Right corneal reflex: normal  Left corneal reflex: normal    CN VII   Facial expression full, symmetric.   Right facial weakness: none  Left facial weakness: none    CN VIII   Hearing: intact    CN IX, X   Palate: symmetric    CN XI   Right sternocleidomastoid strength: normal  Left sternocleidomastoid strength:  normal    CN XII   Tongue: not atrophic  Tongue deviation: none    Motor Exam   Muscle bulk: normal  Right arm tone: normal  Left arm tone: normal  Right leg tone: normal  Left leg tone: normal    Strength   Strength 5/5 except as noted.     Sensory Exam   Light touch normal.     Gait, Coordination, and Reflexes     Gait  Gait: normal    Coordination   Finger to nose coordination: normal  Heel to shin coordination: normal    Reflexes   Right brachioradialis: 2+  Left brachioradialis: 2+  Right biceps: 2+  Left biceps: 2+  Right triceps: 2+  Left triceps: 2+  Right patellar: 2+  Left patellar: 2+  Right achilles: 2+  Left achilles: 2+  Right : 2+  Left : 2+      Assessment/Plan   Independent Review of Radiographic Studies:    I reviewed the lumbar MRI done in May 2019 which shows a Tarlov cyst in the sacrum is quite prominent and mild disc disease at L3-L4, L4-L5, and L5-S1.  Agree with the report.      Medical Decision Making:    We will stick to conservative treatment and raise the gabapentin to 800 mg 3 times daily.  Hopefully this will make an impact.  We will see her in 6 months.    Tricia was seen today for leg pain, vaginal pain, numbness and hip injury.    Diagnoses and all orders for this visit:    Tarlov cyst    Low back pain radiating to lower extremity    DDD (degenerative disc disease), lumbar    Other orders  -     gabapentin (NEURONTIN) 800 MG tablet; Take 1 tablet by mouth 3 (Three) Times a Day.      Return in about 6 months (around 4/28/2020).

## 2019-10-28 ENCOUNTER — OFFICE VISIT (OUTPATIENT)
Dept: NEUROSURGERY | Facility: CLINIC | Age: 69
End: 2019-10-28

## 2019-10-28 VITALS
BODY MASS INDEX: 18.4 KG/M2 | DIASTOLIC BLOOD PRESSURE: 60 MMHG | SYSTOLIC BLOOD PRESSURE: 110 MMHG | HEIGHT: 68 IN | HEART RATE: 58 BPM

## 2019-10-28 DIAGNOSIS — G96.191 TARLOV CYST: Primary | ICD-10-CM

## 2019-10-28 DIAGNOSIS — M54.50 LOW BACK PAIN RADIATING TO LOWER EXTREMITY: ICD-10-CM

## 2019-10-28 DIAGNOSIS — M79.606 LOW BACK PAIN RADIATING TO LOWER EXTREMITY: ICD-10-CM

## 2019-10-28 DIAGNOSIS — M51.36 DDD (DEGENERATIVE DISC DISEASE), LUMBAR: ICD-10-CM

## 2019-10-28 PROCEDURE — 99214 OFFICE O/P EST MOD 30 MIN: CPT | Performed by: NEUROLOGICAL SURGERY

## 2019-10-28 RX ORDER — GABAPENTIN 800 MG/1
800 TABLET ORAL 3 TIMES DAILY
Qty: 90 TABLET | Refills: 5 | Status: SHIPPED | OUTPATIENT
Start: 2019-10-28 | End: 2019-12-17 | Stop reason: SDUPTHER

## 2019-11-22 RX ORDER — LEVOTHYROXINE SODIUM 88 UG/1
88 TABLET ORAL DAILY
Qty: 90 TABLET | Refills: 3 | Status: SHIPPED | OUTPATIENT
Start: 2019-11-22 | End: 2020-12-08

## 2019-12-18 RX ORDER — GABAPENTIN 800 MG/1
TABLET ORAL
Qty: 90 TABLET | Refills: 0 | Status: SHIPPED | OUTPATIENT
Start: 2019-12-18 | End: 2020-05-22 | Stop reason: ALTCHOICE

## 2020-01-09 ENCOUNTER — OFFICE VISIT (OUTPATIENT)
Dept: INTERNAL MEDICINE | Age: 70
End: 2020-01-09

## 2020-01-09 VITALS
OXYGEN SATURATION: 98 % | RESPIRATION RATE: 13 BRPM | HEIGHT: 68 IN | DIASTOLIC BLOOD PRESSURE: 70 MMHG | SYSTOLIC BLOOD PRESSURE: 120 MMHG | TEMPERATURE: 98.3 F | BODY MASS INDEX: 18.88 KG/M2 | WEIGHT: 124.6 LBS | HEART RATE: 62 BPM

## 2020-01-09 DIAGNOSIS — Z00.00 HEALTHCARE MAINTENANCE: ICD-10-CM

## 2020-01-09 DIAGNOSIS — E03.9 ACQUIRED HYPOTHYROIDISM: Primary | ICD-10-CM

## 2020-01-09 DIAGNOSIS — R82.90 MALODOROUS URINE: ICD-10-CM

## 2020-01-09 PROCEDURE — 99214 OFFICE O/P EST MOD 30 MIN: CPT | Performed by: INTERNAL MEDICINE

## 2020-01-09 NOTE — PROGRESS NOTES
"Tricia Cruz is a 69 y.o. female who presents with   Chief Complaint   Patient presents with   • Hypothyroidism     Levothyroxine 88 mcg daily   • Malodorous urine     No fever, chills or dysuria.  Patient does take vitamins which could be a contributing factor.   .    69-year-old female.  6-month checkup/lab update visit.  Only complaint is \"malodorous urine\".  She does take vitamins as noted.  She also recently in November had a biometric screening exam.  Her labs then were as follows:    Cholesterol 199  HDL 94    Glucose 76    The lipid panel will not be repeated on today's visit.    Hypothyroidism   This is a chronic problem. The current episode started more than 1 year ago. The problem has been unchanged. Treatments tried: Levothyroxine 88 mcg daily.        /70   Pulse 62   Temp 98.3 °F (36.8 °C)   Resp 13   Ht 172.7 cm (67.99\")   Wt 56.5 kg (124 lb 9.6 oz)   SpO2 98%   BMI 18.95 kg/m²     The following portions of the patient's history were reviewed and updated as appropriate: allergies, current medications, past medical history and problem list.    Review of Systems   Constitutional: Negative.    HENT: Negative.    Eyes: Negative.    Respiratory: Negative.    Cardiovascular: Negative.    Genitourinary: Negative.    Musculoskeletal: Negative.    Skin: Negative.    Neurological: Negative.    Psychiatric/Behavioral: Negative.        Objective   Physical Exam   Constitutional: She is oriented to person, place, and time. She appears well-developed and well-nourished. No distress.   HENT:   Head: Normocephalic and atraumatic.   Eyes: Pupils are equal, round, and reactive to light. Conjunctivae and EOM are normal.   Neck: Normal range of motion. Neck supple. No thyromegaly present.   Neck exam negative.  Carotid auscultation normal-no bruits heard.   Cardiovascular: Normal rate, regular rhythm, normal heart sounds and intact distal pulses. Exam reveals no gallop and no friction rub.   No murmur " "heard.  Pulmonary/Chest: Effort normal and breath sounds normal. No respiratory distress. She has no wheezes. She has no rales. She exhibits no tenderness.   Neurological: She is alert and oriented to person, place, and time.   Psychiatric: She has a normal mood and affect. Her behavior is normal. Judgment and thought content normal.   Nursing note and vitals reviewed.      Assessment/Plan   Tricia was seen today for hypothyroidism and malodorous urine.    Diagnoses and all orders for this visit:    Acquired hypothyroidism  -     Comprehensive Metabolic Panel  -     TSH+Free T4    Healthcare maintenance  -     Cologuard - Stool, Per Rectum  -     Hepatitis C Antibody    Malodorous urine  -     Comprehensive Metabolic Panel  -     Urinalysis With Culture If Indicated -        Plan: Labs as above for the issues as outlined.  Continue current treatment as prescribed.  6-month follow-up checkup/lab update visit advised.  As noted, the lipid panel will not be repeated on today's visit given the fact that it was normal in November and unchanged from her last lipid profile this past June.    Colonoscopy advised.  She has seen Dr. Chery (gastroenterology) in the past and \"tried\" to have a colonoscopy but could not tolerate the prep.  She is willing to do a Cologuard in place of the colonoscopy but is aware that if it is positive we will need to refer her back to gastroenterology and somehow try to get a colonoscopy performed.    Mammography advised.  Patient declined.  Is willing to assume the risks of undiagnosed breast cancer.    Flu shot advised.  Patient declined.  Willing to assume the risks of latrice influenza.       "

## 2020-01-12 LAB
ALBUMIN SERPL-MCNC: 4.5 G/DL (ref 3.5–5.2)
ALBUMIN/GLOB SERPL: 1.9 G/DL
ALP SERPL-CCNC: 80 U/L (ref 39–117)
ALT SERPL-CCNC: 22 U/L (ref 1–33)
APPEARANCE UR: CLEAR
AST SERPL-CCNC: 26 U/L (ref 1–32)
BACTERIA #/AREA URNS HPF: NORMAL /HPF
BACTERIA UR CULT: ABNORMAL
BACTERIA UR CULT: ABNORMAL
BILIRUB SERPL-MCNC: 0.6 MG/DL (ref 0.2–1.2)
BILIRUB UR QL STRIP: NEGATIVE
BUN SERPL-MCNC: 21 MG/DL (ref 8–23)
BUN/CREAT SERPL: 20.8 (ref 7–25)
CALCIUM SERPL-MCNC: 9.5 MG/DL (ref 8.6–10.5)
CHLORIDE SERPL-SCNC: 103 MMOL/L (ref 98–107)
CO2 SERPL-SCNC: 27.7 MMOL/L (ref 22–29)
COLOR UR: YELLOW
CREAT SERPL-MCNC: 1.01 MG/DL (ref 0.57–1)
EPI CELLS #/AREA URNS HPF: NORMAL /HPF (ref 0–10)
GLOBULIN SER CALC-MCNC: 2.4 GM/DL
GLUCOSE SERPL-MCNC: 82 MG/DL (ref 65–99)
GLUCOSE UR QL: NEGATIVE
HCV AB S/CO SERPL IA: <0.1 S/CO RATIO (ref 0–0.9)
HGB UR QL STRIP: NEGATIVE
KETONES UR QL STRIP: NEGATIVE
LEUKOCYTE ESTERASE UR QL STRIP: ABNORMAL
MICRO URNS: ABNORMAL
NITRITE UR QL STRIP: NEGATIVE
PH UR STRIP: 5.5 [PH] (ref 5–7.5)
POTASSIUM SERPL-SCNC: 4.6 MMOL/L (ref 3.5–5.2)
PROT SERPL-MCNC: 6.9 G/DL (ref 6–8.5)
PROT UR QL STRIP: NEGATIVE
RBC #/AREA URNS HPF: NORMAL /HPF (ref 0–2)
SODIUM SERPL-SCNC: 143 MMOL/L (ref 136–145)
SP GR UR: 1.02 (ref 1–1.03)
T4 FREE SERPL-MCNC: 1.49 NG/DL (ref 0.93–1.7)
TSH SERPL DL<=0.005 MIU/L-ACNC: 0.03 UIU/ML (ref 0.27–4.2)
URINALYSIS REFLEX: ABNORMAL
UROBILINOGEN UR STRIP-MCNC: 0.2 MG/DL (ref 0.2–1)
WBC #/AREA URNS HPF: NORMAL /HPF (ref 0–5)

## 2020-01-13 RX ORDER — AMOXICILLIN 500 MG/1
500 CAPSULE ORAL 3 TIMES DAILY
Qty: 21 CAPSULE | Refills: 0 | Status: SHIPPED | OUTPATIENT
Start: 2020-01-13 | End: 2020-05-22 | Stop reason: ALTCHOICE

## 2020-01-13 NOTE — PROGRESS NOTES
Pt notified of results via MYCHART and phone conversation. Pt verbalized understanding of ABX and when to f/u. Rx sent to local pharmacy. GONZALO

## 2020-01-13 NOTE — PROGRESS NOTES
Urinalysis does show the presence of a urinary tract infection with strep.  These organisms are universally sensitive to penicillins.    With minor variations all other labs are within ranges of acceptability.  Continue all other current medications as prescribed.  A follow-up checkup/lab update visit is advised for 6 months as discussed.

## 2020-01-20 ENCOUNTER — TELEPHONE (OUTPATIENT)
Dept: INTERNAL MEDICINE | Age: 70
End: 2020-01-20

## 2020-01-20 NOTE — TELEPHONE ENCOUNTER
Pt advised to continue to drink Cranberry juice. Pt states she is no long having s/s of UTI. She finished abx this am. MM

## 2020-01-20 NOTE — TELEPHONE ENCOUNTER
Patient wants to know if drinking the cranberry juice on a regualr basis is good for UTI or should she switch to cranberry pills.  Please call patient at [phone]  Ok leave a message

## 2020-04-02 ENCOUNTER — TELEPHONE (OUTPATIENT)
Dept: NEUROSURGERY | Facility: CLINIC | Age: 70
End: 2020-04-02

## 2020-04-02 NOTE — TELEPHONE ENCOUNTER
Left vm. Pt's appointment will need to be moved from [facility] to [facility] schedule anytime that same week.

## 2020-04-16 RX ORDER — GABAPENTIN 600 MG/1
TABLET ORAL
Qty: 270 TABLET | Refills: 4 | Status: SHIPPED | OUTPATIENT
Start: 2020-04-16 | End: 2021-04-20

## 2020-04-17 NOTE — PROGRESS NOTES
"Subjective       History of Present Illness    History of Present Illness: Tricia Cruz is a 70 y.o. female is here today for 6 month follow-up via telephone call. Ms. Cruz was last seen by Dr. Balderas on 10/28/19 for right leg and left hip pain.     Today, she reports pain in the right leg has mostly resolved, with the exception of ongoing numbness and tingling in the right foot, but she does report new pain in the left hip and groin.  She continues to have constant pain in the left vaginal region with a burning sensation.  She has been followed by GYN for this issue.  She reported no improvement with the increased dose of gabapentin.  She remains on 1200 mg of gabapentin every morning with 600 mg at night.  She states that this dosing is able to get her \"through the day.\"  She feels that her balance is worsening but denies any weakness of her extremities.  She denies any falls.  She does report some frequency but otherwise denies any bowel or bladder issues.  Pain overall is a 6 out of 10 in the low back and left hip.  She denies any radiating pain down the left leg.    You have chosen to receive care through a telephone visit. Do you consent to use a telephone visit for your medical care today? Yes      The following portions of the patient's history were reviewed and updated as appropriate: allergies, current medications, past family history, past medical history, past social history, past surgical history and problem list.    Review of Systems   Constitutional: Positive for activity change.   HENT: Negative.    Eyes: Negative.    Respiratory: Negative.    Cardiovascular: Negative.    Gastrointestinal: Negative.    Endocrine: Negative.    Genitourinary: Negative.    Musculoskeletal: Positive for arthralgias, back pain, gait problem and myalgias.   Skin: Negative.    Allergic/Immunologic: Negative.    Neurological: Positive for numbness. Negative for tremors, syncope and weakness.   Hematological: " Negative.    Psychiatric/Behavioral: Negative.        Objective       Physical Exam  Neurologic Exam        Assessment/Plan   Independent Review of Radiographic Studies:      I personally reviewed the images from the following studies.    No new imaging    Medical Decision Making:    I called the patient and spent 20 minutes on the phone for her follow-up telehealth visit.  She has the above-noted symptoms and has had a change from right leg to left hip, buttock and groin pain.  Fortunately, she has had no change with strength and denies any bowel or bladder issues.  Was not able to examine the patient via telehealth visit, but based on her description she has no red flags.  Given the ongoing chronic discomfort that she has, I have ordered a new MRI of the lumbar spine, especially since his symptoms are no longer on the right leg but is moved more to the left side.  She does continue to do some home PT exercises and states that she remains active despite the discomfort.  She did report some balance issues but again denies any falls or weakness.  We will have her follow-up with a telehealth visit once the lumbar MRI is complete.  No changes were made to her medications, recommend continuing on the 1800 mg dosing of gabapentin daily.    The patient was home for the telehealth visit, the provider was in the office.    Plan: Lumbar MRI, telehealth visit to follow    Diagnoses and all orders for this visit:    Low back pain radiating to lower extremity  -     MRI Lumbar Spine Without Contrast; Future    DDD (degenerative disc disease), lumbar  -     MRI Lumbar Spine Without Contrast; Future      Return in about 4 weeks (around 5/21/2020).

## 2020-04-23 ENCOUNTER — OFFICE VISIT (OUTPATIENT)
Dept: NEUROSURGERY | Facility: CLINIC | Age: 70
End: 2020-04-23

## 2020-04-23 DIAGNOSIS — M54.50 LOW BACK PAIN RADIATING TO LOWER EXTREMITY: Primary | ICD-10-CM

## 2020-04-23 DIAGNOSIS — M79.606 LOW BACK PAIN RADIATING TO LOWER EXTREMITY: Primary | ICD-10-CM

## 2020-04-23 DIAGNOSIS — M51.36 DDD (DEGENERATIVE DISC DISEASE), LUMBAR: ICD-10-CM

## 2020-04-23 PROCEDURE — 99212 OFFICE O/P EST SF 10 MIN: CPT | Performed by: NURSE PRACTITIONER

## 2020-04-28 ENCOUNTER — TELEPHONE (OUTPATIENT)
Dept: NEUROSURGERY | Facility: CLINIC | Age: 70
End: 2020-04-28

## 2020-04-28 DIAGNOSIS — G96.191 TARLOV CYST: Primary | ICD-10-CM

## 2020-04-29 NOTE — TELEPHONE ENCOUNTER
PATIENT CALLED TO FOLLOW UP ON STATUS OF FAX BUT CLARIFIED FAX WAS SENT AT WRONG NUMBER. PATIENT WAS GIVEN UPDATED FAX NUMBER. PATIENT REQUESTING TO GET AN AUTH FOR A FULL SACRUM SPINE MRI FOR [employer] INSURANCE. PATIENT STATES OFFICE ALREADY HAS [employer] FAX NUMBER    PLEASE NOTIFIED PATIENT [phone] ONCE APPROVED & FAXED.

## 2020-04-29 NOTE — TELEPHONE ENCOUNTER
Please let her know that I talked to Dr. Balderas, if she wants the sacral MRI we are happy to order it.  He also wants to let her know that if she would like a referral to a neurosurgical specialist for another opinion regarding the Tarlov cyst, he would be happy to arrange that as well

## 2020-04-29 NOTE — TELEPHONE ENCOUNTER
Patient doesn't want to see anyone else. She has already seen Dr. Sherwood, Dr. Alcocer and Dr. Newberry. She will call us once MRI is scheduled at [facility].

## 2020-04-29 NOTE — TELEPHONE ENCOUNTER
PATIENT CALLED BACK TO UPDATE MRI SCHEDULING STATUS AS INSTRTUCTED. APPT FOR MRI WILL BE AT [facility] (FORMERLY KNOWN AS [facility]) ON 5/11 AT 10:30AM.    PLEASE NOTIFY PATIENT [phone] ONCE FAX IS SENT

## 2020-04-30 NOTE — TELEPHONE ENCOUNTER
Patient was made aware yesterday that I would fax order. MRI has been approved. Approval number is in order.

## 2020-05-13 DIAGNOSIS — G96.191 TARLOV CYST: ICD-10-CM

## 2020-05-20 NOTE — PROGRESS NOTES
Subjective     History of Present Illness    History of Present Illness: Tricia Cruz is a 70 y.o. female is here today for follow-up with a new pelvic MRI that was ordered at her last office visit 4/23/2020 for right leg and left hip pain.    She presents to the office today for further evaluation with history of a Tarlov cyst.  She was last seen in the office by Dr. Balderas in October 2019 for right leg and left hip pain.  At her last office visit she reported new pain in the left hip and groin.  She has had new pelvic MRI imaging.    Today her symptoms are the same as her previous visit.  She continues to report right-sided external groin pain.  When this pain is present she also gets a pain in the right foot.  There is no other pain in the right leg.  She denies any weakness of her extremities and also denies any balance or gait instability.  She denies any bowel or bladder problems as well as saddle paresthesias.  Overall, her symptoms are unchanged.  She remains very hesitant to pursue any surgery regarding resection of the known Tarlov cyst.    Patient is wearing a mask in our office today.         /74   Pulse 76   Temp 98.4 °F (36.9 °C)     The following portions of the patient's history were reviewed and updated as appropriate: allergies, current medications, past family history, past medical history, past social history, past surgical history and problem list.    Review of Systems   Respiratory: Negative for chest tightness and shortness of breath.    Cardiovascular: Negative for chest pain.   Genitourinary: Negative for frequency.        Right sided vaginal pain   Musculoskeletal: Positive for arthralgias, back pain and gait problem.        Right leg pain   Neurological: Positive for numbness.       Objective     Visit Vitals  /74   Pulse 76   Temp 98.4 °F (36.9 °C)       Physical Exam   Constitutional: She is oriented to person, place, and time. Vital signs are normal. She appears  well-developed and well-nourished. She is cooperative.  Non-toxic appearance. She does not have a sickly appearance. She does not appear ill.   Very pleasant, well-appearing older female   HENT:   Head: Normocephalic.   Eyes: EOM are normal.   Corrective lenses   Neck: Neck supple.   Pulmonary/Chest: Effort normal.   Musculoskeletal: Normal range of motion. She exhibits no tenderness or deformity.        Lumbar back: She exhibits normal range of motion, no tenderness and no bony tenderness.   Strength equal and intact throughout, full lumbar range of motion   Neurological: She is alert and oriented to person, place, and time. She has normal strength. She displays no tremor and normal reflexes. No sensory deficit. She exhibits normal muscle tone. Gait normal. GCS eye subscore is 4. GCS verbal subscore is 5. GCS motor subscore is 6.   Gait is stable and upright, nonantalgic   Skin: Skin is warm and dry.   Psychiatric: She has a normal mood and affect. Her behavior is normal. Thought content normal.   Vitals reviewed.    Neurologic Exam     Mental Status   Oriented to person, place, and time.     Cranial Nerves     CN III, IV, VI   Extraocular motions are normal.     Motor Exam     Strength   Strength 5/5 throughout.           Assessment/Plan   Independent Review of Radiographic Studies:      I personally reviewed the images from the following studies.    Pelvic MRI from [facility] dated May 11, 2020 She is stable appearance of the previously identified left S2 sacral Tarlov cyst that measures 2.1 x 2.7 x 3.4 cm.  Overall it is unchanged in size.  No other abnormalities noted within the sacral spine.  Degenerative changes in the visualized lower lumbar spine are identified.    Medical Decision Making:    Returns the office today for ongoing follow-up with history of a known sacral Tarlov cyst.  She has had new sacral MRI imaging which shows no change with regards to the size of the cyst.  She also continues to have  right-sided groin pain that she is treating it with 1200 mg gabapentin every morning.  This helps keep her discomfort at bay throughout the day.  Fortunately, she has no red flags including no bowel or bladder dysfunction/ incontinence and she has no weakness on exam.    Dr. Balderas was nice enough to come in and talk to the patient during today's office visit.  He and I both reiterated that given the potential complications of a sacral Tarlov cyst resection and the fact that she is overall quite functional, that we would recommend waiting on any type of surgical resection at this time.  The patient is agreeable to this plan.  She will call back if her symptoms change or worsen.  Dr. Balderas would like to see her back in the office in 9 months with no new imaging.    Plan: Return to office in 9 months to see Dr. Andrey Banerjeendy was seen today for back pain.    Diagnoses and all orders for this visit:    Tarlov cyst    Right groin pain      No follow-ups on file.

## 2020-05-21 ENCOUNTER — TELEPHONE (OUTPATIENT)
Dept: NEUROSURGERY | Facility: CLINIC | Age: 70
End: 2020-05-21

## 2020-05-21 NOTE — TELEPHONE ENCOUNTER
Patient returned phone call and was able to complete the screening and reminded her to bring the imaging disk.

## 2020-05-22 ENCOUNTER — OFFICE VISIT (OUTPATIENT)
Dept: NEUROSURGERY | Facility: CLINIC | Age: 70
End: 2020-05-22

## 2020-05-22 VITALS — TEMPERATURE: 98.4 F | DIASTOLIC BLOOD PRESSURE: 74 MMHG | SYSTOLIC BLOOD PRESSURE: 118 MMHG | HEART RATE: 76 BPM

## 2020-05-22 DIAGNOSIS — R10.31 RIGHT GROIN PAIN: ICD-10-CM

## 2020-05-22 DIAGNOSIS — G96.191 TARLOV CYST: Primary | ICD-10-CM

## 2020-05-22 PROBLEM — R10.32 PAIN IN THE GROIN, LEFT: Status: RESOLVED | Noted: 2018-06-01 | Resolved: 2020-05-22

## 2020-05-22 PROCEDURE — 99214 OFFICE O/P EST MOD 30 MIN: CPT | Performed by: NURSE PRACTITIONER

## 2020-05-22 RX ORDER — GABAPENTIN 600 MG/1
TABLET ORAL
COMMUNITY
Start: 2020-03-19 | End: 2020-05-22

## 2020-07-09 DIAGNOSIS — E03.9 ACQUIRED HYPOTHYROIDISM: ICD-10-CM

## 2020-07-09 DIAGNOSIS — E03.9 ACQUIRED HYPOTHYROIDISM: Primary | ICD-10-CM

## 2020-07-10 LAB
ALBUMIN SERPL-MCNC: 4.1 G/DL (ref 3.5–5.2)
ALBUMIN/GLOB SERPL: 1.6 G/DL
ALP SERPL-CCNC: 75 U/L (ref 39–117)
ALT SERPL-CCNC: 22 U/L (ref 1–33)
APPEARANCE UR: CLEAR
AST SERPL-CCNC: 18 U/L (ref 1–32)
BACTERIA #/AREA URNS HPF: NORMAL /HPF
BASOPHILS # BLD AUTO: 0.03 10*3/MM3 (ref 0–0.2)
BASOPHILS NFR BLD AUTO: 0.7 % (ref 0–1.5)
BILIRUB SERPL-MCNC: 0.5 MG/DL (ref 0–1.2)
BILIRUB UR QL STRIP: NEGATIVE
BUN SERPL-MCNC: 25 MG/DL (ref 8–23)
BUN/CREAT SERPL: 24.3 (ref 7–25)
CALCIUM SERPL-MCNC: 10.1 MG/DL (ref 8.6–10.5)
CHLORIDE SERPL-SCNC: 104 MMOL/L (ref 98–107)
CO2 SERPL-SCNC: 29.8 MMOL/L (ref 22–29)
COLOR UR: YELLOW
CREAT SERPL-MCNC: 1.03 MG/DL (ref 0.57–1)
EOSINOPHIL # BLD AUTO: 0.05 10*3/MM3 (ref 0–0.4)
EOSINOPHIL NFR BLD AUTO: 1.2 % (ref 0.3–6.2)
EPI CELLS #/AREA URNS HPF: NORMAL /HPF (ref 0–10)
ERYTHROCYTE [DISTWIDTH] IN BLOOD BY AUTOMATED COUNT: 12.1 % (ref 12.3–15.4)
GLOBULIN SER CALC-MCNC: 2.5 GM/DL
GLUCOSE SERPL-MCNC: 82 MG/DL (ref 65–99)
GLUCOSE UR QL: NEGATIVE
HCT VFR BLD AUTO: 36.2 % (ref 34–46.6)
HGB BLD-MCNC: 12.1 G/DL (ref 12–15.9)
HGB UR QL STRIP: NEGATIVE
IMM GRANULOCYTES # BLD AUTO: 0.01 10*3/MM3 (ref 0–0.05)
IMM GRANULOCYTES NFR BLD AUTO: 0.2 % (ref 0–0.5)
KETONES UR QL STRIP: NEGATIVE
LEUKOCYTE ESTERASE UR QL STRIP: NEGATIVE
LYMPHOCYTES # BLD AUTO: 1.31 10*3/MM3 (ref 0.7–3.1)
LYMPHOCYTES NFR BLD AUTO: 32.7 % (ref 19.6–45.3)
MCH RBC QN AUTO: 31.7 PG (ref 26.6–33)
MCHC RBC AUTO-ENTMCNC: 33.4 G/DL (ref 31.5–35.7)
MCV RBC AUTO: 94.8 FL (ref 79–97)
MICRO URNS: NORMAL
MICRO URNS: NORMAL
MONOCYTES # BLD AUTO: 0.56 10*3/MM3 (ref 0.1–0.9)
MONOCYTES NFR BLD AUTO: 14 % (ref 5–12)
MUCOUS THREADS URNS QL MICRO: PRESENT /HPF
NEUTROPHILS # BLD AUTO: 2.05 10*3/MM3 (ref 1.7–7)
NEUTROPHILS NFR BLD AUTO: 51.2 % (ref 42.7–76)
NITRITE UR QL STRIP: NEGATIVE
NRBC BLD AUTO-RTO: 0 /100 WBC (ref 0–0.2)
PH UR STRIP: 7 [PH] (ref 5–7.5)
PLATELET # BLD AUTO: 146 10*3/MM3 (ref 140–450)
POTASSIUM SERPL-SCNC: 4.3 MMOL/L (ref 3.5–5.2)
PROT SERPL-MCNC: 6.6 G/DL (ref 6–8.5)
PROT UR QL STRIP: NEGATIVE
RBC # BLD AUTO: 3.82 10*6/MM3 (ref 3.77–5.28)
RBC #/AREA URNS HPF: NORMAL /HPF (ref 0–2)
SODIUM SERPL-SCNC: 142 MMOL/L (ref 136–145)
SP GR UR: 1.02 (ref 1–1.03)
T4 FREE SERPL-MCNC: 1.39 NG/DL (ref 0.93–1.7)
TSH SERPL DL<=0.005 MIU/L-ACNC: 0.04 UIU/ML (ref 0.27–4.2)
URINALYSIS REFLEX: NORMAL
UROBILINOGEN UR STRIP-MCNC: 0.2 MG/DL (ref 0.2–1)
WBC # BLD AUTO: 4.01 10*3/MM3 (ref 3.4–10.8)
WBC #/AREA URNS HPF: NORMAL /HPF (ref 0–5)

## 2020-07-31 ENCOUNTER — OFFICE VISIT (OUTPATIENT)
Dept: INTERNAL MEDICINE | Age: 70
End: 2020-07-31

## 2020-07-31 VITALS
OXYGEN SATURATION: 98 % | BODY MASS INDEX: 19.91 KG/M2 | RESPIRATION RATE: 13 BRPM | DIASTOLIC BLOOD PRESSURE: 60 MMHG | TEMPERATURE: 98.1 F | WEIGHT: 131.4 LBS | HEIGHT: 68 IN | HEART RATE: 78 BPM | SYSTOLIC BLOOD PRESSURE: 138 MMHG

## 2020-07-31 DIAGNOSIS — E03.9 ACQUIRED HYPOTHYROIDISM: Primary | ICD-10-CM

## 2020-07-31 PROCEDURE — 99214 OFFICE O/P EST MOD 30 MIN: CPT | Performed by: INTERNAL MEDICINE

## 2020-07-31 NOTE — PROGRESS NOTES
"Tricia Cruz is a 70 y.o. female who presents with   Chief Complaint   Patient presents with   • Hypothyroidism     Recent labs from July 9, 2020 done outside the office were reviewed.  Her thyroid profile comprehensive profile CBC are all within normal range.   .    70-year-old female presents for a checkup and lab updates however she had labs on July 9 as noted above and since they are normal they will not be repeated on today's visit.  They were reviewed with her in detail.    Hypothyroidism   This is a chronic problem. The current episode started more than 1 year ago. The problem has been unchanged. Treatments tried: Levothyroxine 88 mcg daily. The treatment provided moderate relief.        /60   Pulse 78   Temp 98.1 °F (36.7 °C)   Resp 13   Ht 172.7 cm (67.99\")   Wt 59.6 kg (131 lb 6.4 oz)   SpO2 98%   BMI 19.98 kg/m²     The following portions of the patient's history were reviewed and updated as appropriate: allergies, current medications, past medical history and problem list.    Review of Systems   Constitutional: Negative.    HENT: Negative.    Eyes: Negative.    Respiratory: Negative.    Cardiovascular: Negative.    Genitourinary: Negative.    Musculoskeletal: Negative.    Skin: Negative.    Neurological: Negative.    Psychiatric/Behavioral: Negative.        Objective   Physical Exam   Constitutional: She is oriented to person, place, and time. She appears well-developed and well-nourished. No distress.   HENT:   Head: Normocephalic and atraumatic.   Eyes: Pupils are equal, round, and reactive to light. Conjunctivae and EOM are normal.   Neck: Normal range of motion. Neck supple. No thyromegaly present.   Neck exam negative.  Carotid auscultation normal-no bruits heard.   Cardiovascular: Normal rate, regular rhythm, normal heart sounds and intact distal pulses. Exam reveals no gallop and no friction rub.   No murmur heard.  Pulmonary/Chest: Effort normal and breath sounds normal. No " respiratory distress. She has no wheezes. She has no rales. She exhibits no tenderness.   Neurological: She is alert and oriented to person, place, and time.   Psychiatric: She has a normal mood and affect. Her behavior is normal. Judgment and thought content normal.   Nursing note and vitals reviewed.      Assessment/Plan   Tricia was seen today for hypothyroidism.    Diagnoses and all orders for this visit:    Acquired hypothyroidism      Plan: Continue all treatment as prescribed.  Follow-up checkup/lab update visit in 6 months advised.

## 2020-10-27 ENCOUNTER — TELEPHONE (OUTPATIENT)
Dept: INTERNAL MEDICINE | Age: 70
End: 2020-10-27

## 2020-12-08 RX ORDER — LEVOTHYROXINE SODIUM 88 UG/1
TABLET ORAL
Qty: 90 TABLET | Refills: 2 | Status: SHIPPED | OUTPATIENT
Start: 2020-12-08 | End: 2021-02-03 | Stop reason: DRUGHIGH

## 2021-01-29 ENCOUNTER — TELEPHONE (OUTPATIENT)
Dept: INTERNAL MEDICINE | Age: 71
End: 2021-01-29

## 2021-01-29 NOTE — TELEPHONE ENCOUNTER
Contact patient.     Received surgery clearance paperwork from neurosurgery.     Patient will need appointment for surgery clearance appt. I have only seen patient once and this was in 2018.

## 2021-02-02 ENCOUNTER — LAB (OUTPATIENT)
Dept: LAB | Facility: HOSPITAL | Age: 71
End: 2021-02-02

## 2021-02-02 ENCOUNTER — TELEPHONE (OUTPATIENT)
Dept: INTERNAL MEDICINE | Age: 71
End: 2021-02-02

## 2021-02-02 ENCOUNTER — TRANSCRIBE ORDERS (OUTPATIENT)
Dept: PULMONOLOGY | Facility: HOSPITAL | Age: 71
End: 2021-02-02

## 2021-02-02 ENCOUNTER — HOSPITAL ENCOUNTER (OUTPATIENT)
Dept: GENERAL RADIOLOGY | Facility: HOSPITAL | Age: 71
Discharge: HOME | End: 2021-02-02
Admitting: NURSE PRACTITIONER

## 2021-02-02 ENCOUNTER — OFFICE VISIT (OUTPATIENT)
Dept: INTERNAL MEDICINE | Age: 71
End: 2021-02-02

## 2021-02-02 ENCOUNTER — TRANSCRIBE ORDERS (OUTPATIENT)
Dept: SLEEP MEDICINE | Facility: HOSPITAL | Age: 71
End: 2021-02-02

## 2021-02-02 ENCOUNTER — APPOINTMENT (OUTPATIENT)
Dept: LAB | Facility: HOSPITAL | Age: 71
End: 2021-02-02

## 2021-02-02 VITALS
HEIGHT: 68 IN | OXYGEN SATURATION: 96 % | BODY MASS INDEX: 20.46 KG/M2 | TEMPERATURE: 97.5 F | SYSTOLIC BLOOD PRESSURE: 132 MMHG | DIASTOLIC BLOOD PRESSURE: 76 MMHG | WEIGHT: 135 LBS | HEART RATE: 78 BPM

## 2021-02-02 DIAGNOSIS — Z01.818 ENCOUNTER FOR PREOPERATIVE EXAMINATION FOR GENERAL SURGICAL PROCEDURE: ICD-10-CM

## 2021-02-02 DIAGNOSIS — Z13.220 SCREENING FOR LIPID DISORDERS: ICD-10-CM

## 2021-02-02 DIAGNOSIS — Z01.818 OTHER SPECIFIED PRE-OPERATIVE EXAMINATION: ICD-10-CM

## 2021-02-02 DIAGNOSIS — R93.89 ABNORMAL CXR: Primary | ICD-10-CM

## 2021-02-02 DIAGNOSIS — Z01.818 ENCOUNTER FOR PREOPERATIVE EXAMINATION FOR GENERAL SURGICAL PROCEDURE: Primary | ICD-10-CM

## 2021-02-02 DIAGNOSIS — R09.89 HYPERINFLATION OF LUNGS: ICD-10-CM

## 2021-02-02 DIAGNOSIS — Z01.818 OTHER SPECIFIED PRE-OPERATIVE EXAMINATION: Primary | ICD-10-CM

## 2021-02-02 DIAGNOSIS — E03.9 ACQUIRED HYPOTHYROIDISM: ICD-10-CM

## 2021-02-02 DIAGNOSIS — R94.31 ABNORMAL EKG: Primary | ICD-10-CM

## 2021-02-02 DIAGNOSIS — R94.31 ABNORMAL Q WAVES ON ELECTROCARDIOGRAM: ICD-10-CM

## 2021-02-02 LAB — SARS-COV-2 ORF1AB RESP QL NAA+PROBE: NOT DETECTED

## 2021-02-02 PROCEDURE — 93000 ELECTROCARDIOGRAM COMPLETE: CPT | Performed by: NURSE PRACTITIONER

## 2021-02-02 PROCEDURE — U0004 COV-19 TEST NON-CDC HGH THRU: HCPCS

## 2021-02-02 PROCEDURE — 99215 OFFICE O/P EST HI 40 MIN: CPT | Performed by: NURSE PRACTITIONER

## 2021-02-02 PROCEDURE — 71046 X-RAY EXAM CHEST 2 VIEWS: CPT

## 2021-02-02 PROCEDURE — C9803 HOPD COVID-19 SPEC COLLECT: HCPCS

## 2021-02-02 NOTE — TELEPHONE ENCOUNTER
"I personally discussed EKG findings with patient over the telephone.     Also, had CXR that shows \"severe COPD\" which was apparently compared to previous CXR in 2013.     Patient denies any SOA, cough. Has never been a smoker.     Discussed with patient that she would also need PFTs done prior to surgery clearance.     "

## 2021-02-02 NOTE — PROGRESS NOTES
[facility] INTERNAL MEDICINE  NADIYA Cruz / 70 y.o. / female  02/02/2021      ASSESSMENT & PLAN:    Problem List Items Addressed This Visit        Endocrine and Metabolic    Acquired hypothyroidism    Relevant Medications    levothyroxine (SYNTHROID, LEVOTHROID) 88 MCG tablet    Other Relevant Orders    TSH+Free T4      Other Visit Diagnoses     Encounter for preoperative examination for general surgical procedure    -  Primary    Relevant Orders    CBC & Differential    Comprehensive Metabolic Panel    Protime-INR    APTT    Urinalysis With Culture If Indicated - Urine, Clean Catch    XR Chest PA & Lateral    Screening for lipid disorders        Relevant Orders    Lipid Panel With / Chol / HDL Ratio        Orders Placed This Encounter   Procedures   • XR Chest PA & Lateral   • Comprehensive Metabolic Panel   • Protime-INR   • APTT   • TSH+Free T4   • Urinalysis With Culture If Indicated - Urine, Clean Catch   • Lipid Panel With / Chol / HDL Ratio   • ECG 12 Lead   • CBC & Differential     No orders of the defined types were placed in this encounter.      Summary/Discussion:    1. Encounter for preoperative examination for general surgical procedure  Labs as ordered by surgeon.   CXR today.   EKG abnormal, discussed with Dr. Philippe, recommend proceed with nuclear stress test to r/o previous infarction or ischemia.   Otherwise, patient low surgical risk per NSQIP risk stratification.   Pending results of cardiac study, labs, imaging, will confer with surgeon regarding fitness for surgery.     - CBC & Differential  - Comprehensive Metabolic Panel  - Protime-INR  - APTT  - Urinalysis With Culture If Indicated - Urine, Clean Catch  - XR Chest PA & Lateral    2. Acquired hypothyroidism    - TSH+Free T4    3. Screening for lipid disorders    - Lipid Panel With / Chol / HDL Ratio    MDM  Number of Diagnoses or Management Options  Acquired hypothyroidism: established, worsening  Encounter for preoperative  "examination for general surgical procedure: new, needed workup  Screening for lipid disorders: minor     Amount and/or Complexity of Data Reviewed  Clinical lab tests: ordered and reviewed  Tests in the medicine section of CPT®: ordered  Review and summarize past medical records: yes  Discuss the patient with other providers: yes  Independent visualization of images, tracings, or specimens: yes    Risk of Complications, Morbidity, and/or Mortality  Presenting problems: high  Diagnostic procedures: high  Management options: high    Critical Care  Total time providing critical care: 30-74 minutes        Return in about 6 months (around 8/2/2021) for Next scheduled follow up.    ____________________________________________________________________    VITALS:    Visit Vitals  /76   Pulse 78   Temp 97.5 °F (36.4 °C) (Temporal)   Ht 172.7 cm (67.99\")   Wt 61.2 kg (135 lb)   SpO2 96%   BMI 20.53 kg/m²       BP Readings from Last 3 Encounters:   02/02/21 132/76   07/31/20 138/60   05/22/20 118/74     Wt Readings from Last 3 Encounters:   02/02/21 61.2 kg (135 lb)   07/31/20 59.6 kg (131 lb 6.4 oz)   01/09/20 56.5 kg (124 lb 9.6 oz)      Body mass index is 20.53 kg/m².    CC: Main reason(s) for today's visit: Establish Care, Hypothyroidism, and Surgical Clearance      HPI:    Patient is a 70 y.o. female who is here to establish care. Previous PCP Dr. Ines Soto prior to that.     She is having surgery in [place] on 2/24 for sacral laminectomy, treatment sacral meningeal cysts, dorsosacral reconstruction.     PMH significant for hypothyroidism. Reports had cardiac workup at least 10 years ago for \"chest pain\" that was normal. Denies any chest pain, shortness of breath, exercise intolerance.   Family history CAD in father.       Patient Care Team:  Sakina Jose APRN as PCP - General (Internal Medicine)  ____________________________________________________________________      REVIEW OF SYSTEMS    Review of " Systems   Constitutional: Negative for activity change, appetite change and unexpected weight change.   HENT: Negative for tinnitus.    Eyes: Negative for visual disturbance.   Respiratory: Negative for cough, chest tightness and shortness of breath.    Cardiovascular: Negative for chest pain, palpitations and leg swelling.   Neurological: Negative for dizziness, light-headedness and headaches.       PHYSICAL EXAMINATION    Physical Exam  Vitals signs and nursing note reviewed.   Constitutional:       General: She is not in acute distress.     Appearance: Normal appearance. She is well-developed. She is not ill-appearing.   HENT:      Head: Normocephalic and atraumatic.      Right Ear: Hearing, tympanic membrane, ear canal and external ear normal.      Left Ear: Hearing, tympanic membrane, ear canal and external ear normal.   Eyes:      Pupils: Pupils are equal, round, and reactive to light.   Neck:      Musculoskeletal: Full passive range of motion without pain.      Thyroid: No thyroid mass or thyromegaly.      Vascular: No carotid bruit.   Cardiovascular:      Rate and Rhythm: Normal rate and regular rhythm.      Heart sounds: Normal heart sounds and S1 normal. No murmur.   Pulmonary:      Effort: Pulmonary effort is normal.      Breath sounds: Normal breath sounds. No decreased breath sounds, wheezing, rhonchi or rales.   Abdominal:      General: Bowel sounds are normal. There is no abdominal bruit.      Palpations: Abdomen is soft.      Tenderness: There is no abdominal tenderness.   Lymphadenopathy:      Cervical: No cervical adenopathy.      Upper Body:      Right upper body: No supraclavicular adenopathy.      Left upper body: No supraclavicular adenopathy.   Skin:     General: Skin is warm and dry.   Neurological:      Mental Status: She is alert and oriented to person, place, and time. She is not disoriented.      Cranial Nerves: No cranial nerve deficit.      Sensory: No sensory deficit.   Psychiatric:          Speech: Speech normal.         Behavior: Behavior normal. Behavior is cooperative.         Thought Content: Thought content normal.         Judgment: Judgment normal.         ECG 12 Lead    Date/Time: 2/2/2021 12:04 PM  Performed by: Sakina Jose APRN  Authorized by: Sakina Jose APRN   Comparison: not compared with previous ECG   Previous ECG: no previous ECG available  Rhythm: sinus rhythm  Rate: normal  Conduction: conduction normal  Conduction: left anterior fascicular block  Q waves: V1 and V2    ST Segments: ST segments normal  T Waves: T waves normal  QRS axis: normal  Other: no other findings    Clinical impression: abnormal EKG  Comments: Reviewed EKG with Dr. Philippe  Q waves present septal leads                 REVIEWED DATA:    Labs:   Lab Results   Component Value Date     07/09/2020    K 4.3 07/09/2020    AST 18 07/09/2020    ALT 22 07/09/2020    BUN 25 (H) 07/09/2020    CREATININE 1.03 (H) 07/09/2020    CREATININE 1.01 (H) 01/09/2020    CREATININE 0.99 06/28/2019    EGFRIFNONA 53 (L) 07/09/2020    EGFRIFAFRI 65 07/09/2020       Lab Results   Component Value Date    GLUCOSE 87 03/24/2015       Lab Results   Component Value Date    LDL 73 06/28/2019    LDL 88 06/01/2018    HDL 99 (H) 06/28/2019    TRIG 39 06/28/2019       Lab Results   Component Value Date    TSH 0.039 (L) 07/09/2020    FREET4 1.39 07/09/2020          Lab Results   Component Value Date    WBC 4.01 07/09/2020    HGB 12.1 07/09/2020    HGB 13.0 12/20/2018    HGB 12.4 06/01/2018     07/09/2020         Imaging:        Medical Tests:        Summary of old records / correspondence / consultant report:        Request outside records:        ______________________________________________________________________    ALLERGIES  Allergies   Allergen Reactions   • Keflex [Cephalexin] Nausea And Vomiting   • Meloxicam Nausea And Vomiting        MEDICATIONS  Current Outpatient Medications on File Prior to Visit   Medication Sig    • Calcium Carbonate-Vitamin D (CALTRATE 600+D PO) Take  by mouth.   • gabapentin (NEURONTIN) 600 MG tablet TAKE ONE TABLET BY MOUTH THREE TIMES A DAY (Patient taking differently: 600 mg.)   • levothyroxine (SYNTHROID, LEVOTHROID) 88 MCG tablet TAKE ONE TABLET BY MOUTH DAILY   • Multiple Vitamin (MULTIVITAMIN) capsule Take 1 capsule by mouth Daily.   • NON FORMULARY EZ-T .O3% CREAM TWICE DAILY     No current facility-administered medications on file prior to visit.        PFSH:     The following portions of the patient's history were reviewed and updated as appropriate: Allergies / Current Medications / Past Medical History / Surgical History / Social History / Family History    PROBLEM LIST   Patient Active Problem List   Diagnosis   • Low back pain radiating to lower extremity   • Heartburn   • Alopecia   • Fatigue   • Chest pain   • Anxiety   • Vulvodynia   • Tarlov cyst   • DDD (degenerative disc disease), lumbar   • Acquired hypothyroidism   • Gastroesophageal reflux disease without esophagitis   • Right groin pain       PAST MEDICAL HISTORY  Past Medical History:   Diagnosis Date   • Anxiety    • Pain in the groin, left 6/1/2018   • Pneumonia 2014   • Tarlov cyst        SURGICAL HISTORY  History reviewed. No pertinent surgical history.    SOCIAL HISTORY  Social History     Socioeconomic History   • Marital status:      Spouse name: Not on file   • Number of children: 0   • Years of education: MA   • Highest education level: Not on file   Occupational History   • Occupation: RETIRED   Tobacco Use   • Smoking status: Never Smoker   • Smokeless tobacco: Never Used   Substance and Sexual Activity   • Alcohol use: No   • Drug use: No   • Sexual activity: Defer   Social History Narrative    LIVES WITH SPOUSE       FAMILY HISTORY  Family History   Problem Relation Age of Onset   • Prostate cancer Father    • Heart disease Father    • Heart attack Father    • Cancer Sister         Sarcoma   • Hypertension  Sister    • Thyroid disease Sister    • Atrial fibrillation Sister    • Lymphoma Brother    • No Known Problems Mother    • Heart disease Maternal Grandmother    • Stroke Maternal Grandmother          **Wei Disclaimer:   Much of this encounter note is an electronic transcription/translation of spoken language to printed text. The electronic translation of spoken language may permit erroneous, or at times, nonsensical words or phrases to be inadvertently transcribed. Although I have reviewed the note for such errors, some may still exist.

## 2021-02-02 NOTE — TELEPHONE ENCOUNTER
Please contact patient.     I discussed EKG findings with Dr. Philippe.   He recommends proceeding with nuclear stress test for evaluation of abnormalities/ rule out any cardiac issue prior to surgery.  If she is agreeable to this, I will go ahead and place order, we will attempt to have this done ASAP for surgery.

## 2021-02-03 ENCOUNTER — TELEPHONE (OUTPATIENT)
Dept: INTERNAL MEDICINE | Age: 71
End: 2021-02-03

## 2021-02-03 DIAGNOSIS — E03.9 ACQUIRED HYPOTHYROIDISM: Primary | ICD-10-CM

## 2021-02-03 LAB
ALBUMIN SERPL-MCNC: 4.7 G/DL (ref 3.5–5.2)
ALBUMIN/GLOB SERPL: 2.1 G/DL
ALP SERPL-CCNC: 72 U/L (ref 39–117)
ALT SERPL-CCNC: 18 U/L (ref 1–33)
APPEARANCE UR: CLEAR
APTT PPP: 30.3 SECONDS (ref 22.7–35.4)
AST SERPL-CCNC: 25 U/L (ref 1–32)
BACTERIA #/AREA URNS HPF: NORMAL /HPF
BASOPHILS # BLD AUTO: 0.04 10*3/MM3 (ref 0–0.2)
BASOPHILS NFR BLD AUTO: 1 % (ref 0–1.5)
BILIRUB SERPL-MCNC: 0.6 MG/DL (ref 0–1.2)
BILIRUB UR QL STRIP: NEGATIVE
BUN SERPL-MCNC: 22 MG/DL (ref 8–23)
BUN/CREAT SERPL: 21.8 (ref 7–25)
CALCIUM SERPL-MCNC: 9.6 MG/DL (ref 8.6–10.5)
CHLORIDE SERPL-SCNC: 102 MMOL/L (ref 98–107)
CHOLEST SERPL-MCNC: 209 MG/DL (ref 0–200)
CHOLEST/HDLC SERPL: 2.25 {RATIO}
CO2 SERPL-SCNC: 28.8 MMOL/L (ref 22–29)
COLOR UR: YELLOW
CREAT SERPL-MCNC: 1.01 MG/DL (ref 0.57–1)
EOSINOPHIL # BLD AUTO: 0.04 10*3/MM3 (ref 0–0.4)
EOSINOPHIL NFR BLD AUTO: 1 % (ref 0.3–6.2)
EPI CELLS #/AREA URNS HPF: NORMAL /HPF (ref 0–10)
ERYTHROCYTE [DISTWIDTH] IN BLOOD BY AUTOMATED COUNT: 11.9 % (ref 12.3–15.4)
GLOBULIN SER CALC-MCNC: 2.2 GM/DL
GLUCOSE SERPL-MCNC: 82 MG/DL (ref 65–99)
GLUCOSE UR QL: NEGATIVE
HCT VFR BLD AUTO: 39.2 % (ref 34–46.6)
HDLC SERPL-MCNC: 93 MG/DL (ref 40–60)
HGB BLD-MCNC: 13 G/DL (ref 12–15.9)
HGB UR QL STRIP: NEGATIVE
IMM GRANULOCYTES # BLD AUTO: 0.01 10*3/MM3 (ref 0–0.05)
IMM GRANULOCYTES NFR BLD AUTO: 0.3 % (ref 0–0.5)
INR PPP: 0.93 (ref 0.9–1.1)
KETONES UR QL STRIP: NEGATIVE
LDLC SERPL CALC-MCNC: 106 MG/DL (ref 0–100)
LEUKOCYTE ESTERASE UR QL STRIP: NEGATIVE
LYMPHOCYTES # BLD AUTO: 1.19 10*3/MM3 (ref 0.7–3.1)
LYMPHOCYTES NFR BLD AUTO: 30.4 % (ref 19.6–45.3)
MCH RBC QN AUTO: 31.7 PG (ref 26.6–33)
MCHC RBC AUTO-ENTMCNC: 33.2 G/DL (ref 31.5–35.7)
MCV RBC AUTO: 95.6 FL (ref 79–97)
MICRO URNS: NORMAL
MICRO URNS: NORMAL
MONOCYTES # BLD AUTO: 0.39 10*3/MM3 (ref 0.1–0.9)
MONOCYTES NFR BLD AUTO: 9.9 % (ref 5–12)
MUCOUS THREADS URNS QL MICRO: PRESENT /HPF
NEUTROPHILS # BLD AUTO: 2.25 10*3/MM3 (ref 1.7–7)
NEUTROPHILS NFR BLD AUTO: 57.4 % (ref 42.7–76)
NITRITE UR QL STRIP: NEGATIVE
NRBC BLD AUTO-RTO: 0 /100 WBC (ref 0–0.2)
PH UR STRIP: 6.5 [PH] (ref 5–7.5)
PLATELET # BLD AUTO: 147 10*3/MM3 (ref 140–450)
POTASSIUM SERPL-SCNC: 4.3 MMOL/L (ref 3.5–5.2)
PROT SERPL-MCNC: 6.9 G/DL (ref 6–8.5)
PROT UR QL STRIP: NEGATIVE
PROTHROMBIN TIME: 12.3 SECONDS (ref 11.7–14.2)
RBC # BLD AUTO: 4.1 10*6/MM3 (ref 3.77–5.28)
RBC #/AREA URNS HPF: NORMAL /HPF (ref 0–2)
SODIUM SERPL-SCNC: 140 MMOL/L (ref 136–145)
SP GR UR: 1.02 (ref 1–1.03)
T4 FREE SERPL-MCNC: 1.48 NG/DL (ref 0.93–1.7)
TRIGL SERPL-MCNC: 53 MG/DL (ref 0–150)
TSH SERPL DL<=0.005 MIU/L-ACNC: 0.06 UIU/ML (ref 0.27–4.2)
URINALYSIS REFLEX: NORMAL
UROBILINOGEN UR STRIP-MCNC: 0.2 MG/DL (ref 0.2–1)
VLDLC SERPL CALC-MCNC: 10 MG/DL (ref 5–40)
WBC # BLD AUTO: 3.92 10*3/MM3 (ref 3.4–10.8)
WBC #/AREA URNS HPF: NORMAL /HPF (ref 0–5)

## 2021-02-03 RX ORDER — LEVOTHYROXINE SODIUM 0.05 MG/1
50 TABLET ORAL DAILY
Qty: 90 TABLET | Refills: 0 | Status: SHIPPED | OUTPATIENT
Start: 2021-02-03 | End: 2021-04-23

## 2021-02-03 NOTE — TELEPHONE ENCOUNTER
Please contact patient.     Her labs show that her thyroid level is too HIGH.   We will need to decrease her thyroid dosage.   Unfortunately, this change does take about 6 weeks for full effectiveness. Having too much thyroid replacement on board is a risk as it can cause heart arrhythmias and other issues.     Considering this, I cannot clear you for your upcoming surgery at this time as we will need to get your thyroid level to a normal level and this will take at least 6 weeks.     I am going to go ahead and send your new dosage of medication. You will need lab appointment in 6 weeks (please schedule this Margaret).     I am out of the office today, but I will call her surgeon tomorrow and update them with everything that is going on. I would go ahead and proceed with her stress test and pulmonary function testing that has been ordered.

## 2021-02-03 NOTE — TELEPHONE ENCOUNTER
Patient has been notified of your message below.     She is going to get the new medication today from her pharmacy.  Her repeat lab appointment is on March 17 @ 11am here at our office.    She asked if her drinking a lot of milk and taking Citracal could cause her thyroid to be high, since it is extra calcium.

## 2021-02-04 ENCOUNTER — HOSPITAL ENCOUNTER (OUTPATIENT)
Dept: NUCLEAR MEDICINE | Facility: HOSPITAL | Age: 71
Discharge: HOME | End: 2021-02-04

## 2021-02-04 ENCOUNTER — HOSPITAL ENCOUNTER (OUTPATIENT)
Dept: RESPIRATORY THERAPY | Facility: HOSPITAL | Age: 71
Discharge: HOME | End: 2021-02-04
Admitting: NURSE PRACTITIONER

## 2021-02-04 ENCOUNTER — TELEPHONE (OUTPATIENT)
Dept: INTERNAL MEDICINE | Age: 71
End: 2021-02-04

## 2021-02-04 LAB
BH CV STRESS BP STAGE 1: NORMAL
BH CV STRESS BP STAGE 2: NORMAL
BH CV STRESS DURATION MIN STAGE 1: 3
BH CV STRESS DURATION MIN STAGE 2: 3
BH CV STRESS DURATION SEC STAGE 1: 0
BH CV STRESS DURATION SEC STAGE 2: 0
BH CV STRESS GRADE STAGE 1: 10
BH CV STRESS GRADE STAGE 2: 12
BH CV STRESS HR STAGE 1: 112
BH CV STRESS HR STAGE 2: 136
BH CV STRESS METS STAGE 1: 5
BH CV STRESS METS STAGE 2: 7.5
BH CV STRESS PROTOCOL 1: NORMAL
BH CV STRESS RECOVERY BP: NORMAL MMHG
BH CV STRESS RECOVERY HR: 98 BPM
BH CV STRESS SPEED STAGE 1: 1.7
BH CV STRESS SPEED STAGE 2: 2.5
BH CV STRESS STAGE 1: 1
BH CV STRESS STAGE 2: 2
MAXIMAL PREDICTED HEART RATE: 152 BPM
PERCENT MAX PREDICTED HR: 89.47 %
STRESS BASELINE BP: NORMAL MMHG
STRESS BASELINE HR: 64 BPM
STRESS PERCENT HR: 105 %
STRESS POST ESTIMATED WORKLOAD: 7.1 METS
STRESS POST EXERCISE DUR MIN: 6 MIN
STRESS POST EXERCISE DUR SEC: 0 SEC
STRESS POST PEAK BP: NORMAL MMHG
STRESS POST PEAK HR: 136 BPM
STRESS TARGET HR: 129 BPM

## 2021-02-04 PROCEDURE — 94729 DIFFUSING CAPACITY: CPT

## 2021-02-04 PROCEDURE — 78452 HT MUSCLE IMAGE SPECT MULT: CPT

## 2021-02-04 PROCEDURE — 94640 AIRWAY INHALATION TREATMENT: CPT

## 2021-02-04 PROCEDURE — 78452 HT MUSCLE IMAGE SPECT MULT: CPT | Performed by: INTERNAL MEDICINE

## 2021-02-04 PROCEDURE — A9500 TC99M SESTAMIBI: HCPCS | Performed by: NURSE PRACTITIONER

## 2021-02-04 PROCEDURE — 93016 CV STRESS TEST SUPVJ ONLY: CPT | Performed by: INTERNAL MEDICINE

## 2021-02-04 PROCEDURE — 93018 CV STRESS TEST I&R ONLY: CPT | Performed by: INTERNAL MEDICINE

## 2021-02-04 PROCEDURE — 0 TECHNETIUM SESTAMIBI: Performed by: NURSE PRACTITIONER

## 2021-02-04 PROCEDURE — 94060 EVALUATION OF WHEEZING: CPT

## 2021-02-04 PROCEDURE — 93017 CV STRESS TEST TRACING ONLY: CPT

## 2021-02-04 PROCEDURE — 94726 PLETHYSMOGRAPHY LUNG VOLUMES: CPT

## 2021-02-04 PROCEDURE — 25010000002 REGADENOSON 0.4 MG/5ML SOLUTION: Performed by: NURSE PRACTITIONER

## 2021-02-04 RX ORDER — ALBUTEROL SULFATE 2.5 MG/3ML
2.5 SOLUTION RESPIRATORY (INHALATION) ONCE
Status: COMPLETED | OUTPATIENT
Start: 2021-02-04 | End: 2021-02-04

## 2021-02-04 RX ADMIN — TECHNETIUM TC 99M SESTAMIBI 1 DOSE: 1 INJECTION INTRAVENOUS at 07:35

## 2021-02-04 RX ADMIN — TECHNETIUM TC 99M SESTAMIBI 1 DOSE: 1 INJECTION INTRAVENOUS at 10:00

## 2021-02-04 RX ADMIN — ALBUTEROL SULFATE 2.5 MG: 2.5 SOLUTION RESPIRATORY (INHALATION) at 13:07

## 2021-02-04 RX ADMIN — REGADENOSON 0.4 MG: 0.08 INJECTION, SOLUTION INTRAVENOUS at 10:00

## 2021-02-04 NOTE — TELEPHONE ENCOUNTER
Attempted to contact neurosurgeon Dr. Teran office to let them know that patient will need to be rescheduled for surgery. Left VM for surgery scheduler to return call.

## 2021-02-09 NOTE — PROGRESS NOTES
Subjective   Patient ID: Tricia Cruz is a 70 y.o. female is here today for follow-up. Patient was last seen in the office on 5/22/2020 with JAY Wills for back pain. No new imaging.     Today, the patient reports she has right buttock and foot pain.    Patient, provider and MA are all wearing a mask in our office today.    This is a lady that I have followed for several years with symptomatic Tarlov cyst in the sacrum causing right groin pain, leg pain, buttock pain and foot pain. I have never doubted this is from the cyst, although I have never recommended surgery because of my own experience with this has been both small in volume and not very successful. She had has looked on the internet and has come across Dr. Teran of [place] who devotes almost his entire practice to Tarlov cyst and has considered having hers operated on. She says by her own account that she is not debilitated by this. She takes gabapentin at 600 mg b.i.d. and it seems to control the symptoms just as well as a higher dose so she has stayed on that particular dose. She can walk. She feels functional. She does not have any motor deficits and the symptoms do come and go. If that is the case I told her it is probably best to sit tight and not do any surgery. On the other hand if she gets debilitated from a pain standpoint then I told her that I did not have any objection if she went to see Dr. Teran in [place] and get the surgery. But I have also been following her for several years so she never has deteriorated from the motor perspective and I told her that she is not likely to. I would be happy to follow her since I am giving her the gabapentin at 600 mg b.i.d. so I asked her to come and see me in 9 months. If she decides to have the surgery in [place] I asked her to let me know.            History of Present Illness    The following portions of the patient's history were reviewed and updated as appropriate: allergies, current  "medications, past family history, past medical history, past social history, past surgical history and problem list.    Review of Systems   Constitutional: Positive for fever.   Respiratory: Negative for chest tightness and shortness of breath.    Cardiovascular: Negative for chest pain.   Musculoskeletal: Positive for back pain and myalgias.        Right foot pain. Right buttock pain   All other systems reviewed and are negative.          Objective     Vitals:    02/22/21 1251   BP: 118/72   Pulse: 64   Temp: 97.8 °F (36.6 °C)   Weight: 61.2 kg (135 lb)   Height: 172.7 cm (67.99\")     Body mass index is 20.53 kg/m².      Physical Exam  Constitutional:       Appearance: She is well-developed.   HENT:      Head: Normocephalic and atraumatic.   Eyes:      Extraocular Movements: EOM normal.      Conjunctiva/sclera: Conjunctivae normal.      Pupils: Pupils are equal, round, and reactive to light.   Neck:      Vascular: No carotid bruit.   Neurological:      Mental Status: She is oriented to person, place, and time.      Coordination: Finger-Nose-Finger Test and Heel to Shin Test normal.      Gait: Gait is intact.      Deep Tendon Reflexes:      Reflex Scores:       Tricep reflexes are 2+ on the right side and 2+ on the left side.       Bicep reflexes are 2+ on the right side and 2+ on the left side.       Brachioradialis reflexes are 2+ on the right side and 2+ on the left side.       Patellar reflexes are 2+ on the right side and 2+ on the left side.       Achilles reflexes are 2+ on the right side and 2+ on the left side.  Psychiatric:         Speech: Speech normal.       Neurologic Exam     Mental Status   Oriented to person, place, and time.   Registration of memory: Good recent and remote memory.   Attention: normal. Concentration: normal.   Speech: speech is normal   Level of consciousness: alert  Knowledge: consistent with education.     Cranial Nerves     CN II   Visual fields full to confrontation.   Visual " acuity: normal    CN III, IV, VI   Pupils are equal, round, and reactive to light.  Extraocular motions are normal.     CN V   Facial sensation intact.   Right corneal reflex: normal  Left corneal reflex: normal    CN VII   Facial expression full, symmetric.   Right facial weakness: none  Left facial weakness: none    CN VIII   Hearing: intact    CN IX, X   Palate: symmetric    CN XI   Right sternocleidomastoid strength: normal  Left sternocleidomastoid strength: normal    CN XII   Tongue: not atrophic  Tongue deviation: none    Motor Exam   Muscle bulk: normal  Right arm tone: normal  Left arm tone: normal  Right leg tone: normal  Left leg tone: normal    Strength   Strength 5/5 except as noted.     Sensory Exam   Light touch normal.     Gait, Coordination, and Reflexes     Gait  Gait: normal    Coordination   Finger to nose coordination: normal  Heel to shin coordination: normal    Reflexes   Right brachioradialis: 2+  Left brachioradialis: 2+  Right biceps: 2+  Left biceps: 2+  Right triceps: 2+  Left triceps: 2+  Right patellar: 2+  Left patellar: 2+  Right achilles: 2+  Left achilles: 2+  Right : 2+  Left : 2+          Assessment/Plan   Independent Review of Radiographic Studies:      I personally reviewed the images from the following studies.    I reviewed the lumbar MRI done in May 2020 which shows a Tarlov cyst in the sacrum is quite prominent and mild disc disease at L3-L4, L4-L5, and L5-S1.  Agree with the report.    Medical Decision Making:      Clinically stable.  I will continue to give her the gabapentin at 600 mg twice daily.  Will make a follow-up visit in 9 months.  If she decides that she wants Dr. Teran to do surgery, she will let me know.          Diagnoses and all orders for this visit:    1. Tarlov cyst (Primary)    2. Low back pain radiating to lower extremity    3. Right groin pain      Return in about 9 months (around 11/22/2021) for Face-to-face.

## 2021-02-22 ENCOUNTER — OFFICE VISIT (OUTPATIENT)
Dept: NEUROSURGERY | Facility: CLINIC | Age: 71
End: 2021-02-22

## 2021-02-22 VITALS
HEART RATE: 64 BPM | BODY MASS INDEX: 20.46 KG/M2 | DIASTOLIC BLOOD PRESSURE: 72 MMHG | TEMPERATURE: 97.8 F | SYSTOLIC BLOOD PRESSURE: 118 MMHG | WEIGHT: 135 LBS | HEIGHT: 68 IN

## 2021-02-22 DIAGNOSIS — R10.31 RIGHT GROIN PAIN: ICD-10-CM

## 2021-02-22 DIAGNOSIS — M54.50 LOW BACK PAIN RADIATING TO LOWER EXTREMITY: ICD-10-CM

## 2021-02-22 DIAGNOSIS — G96.191 TARLOV CYST: Primary | ICD-10-CM

## 2021-02-22 DIAGNOSIS — M79.606 LOW BACK PAIN RADIATING TO LOWER EXTREMITY: ICD-10-CM

## 2021-02-22 PROCEDURE — 99213 OFFICE O/P EST LOW 20 MIN: CPT | Performed by: NEUROLOGICAL SURGERY

## 2021-04-20 RX ORDER — GABAPENTIN 600 MG/1
TABLET ORAL
Qty: 270 TABLET | Refills: 3 | Status: SHIPPED | OUTPATIENT
Start: 2021-04-20 | End: 2022-08-15

## 2021-04-20 NOTE — TELEPHONE ENCOUNTER
RX refill request from pharmacy    Patient was last seen: 02-22-21    Patients next appointment: 11-22-21

## 2021-04-23 DIAGNOSIS — E03.9 ACQUIRED HYPOTHYROIDISM: ICD-10-CM

## 2021-04-23 RX ORDER — LEVOTHYROXINE SODIUM 0.05 MG/1
TABLET ORAL
Qty: 90 TABLET | Refills: 0 | Status: SHIPPED | OUTPATIENT
Start: 2021-04-23 | End: 2021-07-27

## 2021-04-29 ENCOUNTER — TELEPHONE (OUTPATIENT)
Dept: NEUROSURGERY | Facility: CLINIC | Age: 71
End: 2021-04-29

## 2021-04-29 NOTE — TELEPHONE ENCOUNTER
Provider: CHAPO    Caller:  CECILYCHELA FAROOQ    Relationship to Patient: SELF      Phone Number: [phone]    Reason for Call: PT CECILY FAROOQ CALLED BECAUSE FOR THE LAST 2 WEEKS SHE HAS BEEN HAVING A HEADACHE, NAUSEA, HER EARS HURT AND PAIN IN HER EYES.  PT STATES SHE HAS WENT TO THE ENT AND THEY TOLD HER THAT HER EARS WERE CLEAR.  PT ALSO STATES SHE HAS WENT TO THE EYE DR AND GOT NEW GLASSES.  PT STATES SHE HAS A TARLOV CYST AND WOULD LIKE TO KNOW IF SHE IS HAVING SPINAL LEAKAGE?    When was the patient last seen: 02/22/2021    When did it start: 2 WEEKS AGO    Where is it located: HEADACHE,EYES AND EARS    Characteristics of symptom/severity:     Timing- Is it constant or intermittent: CONSTANT    What makes it worse: THE LATER IN THE DAY IT GETS WORSE    What makes it better: NOTHING    What therapies/medications have you tried:  PT STATES SHE IS TAKING PRESCRIPTION THAT WAS PRESCRIBED TO HER FROM DR. COWAN AND DRAMAMINE FOR THE NAUSEA.  PT STATES THAT THE NAUSEA GETS SO BAD SHE JUST LAYS DOWN.      PLEASE CALL PT  THANK YOU

## 2021-04-30 ENCOUNTER — TELEPHONE (OUTPATIENT)
Dept: NEUROSURGERY | Facility: CLINIC | Age: 71
End: 2021-04-30

## 2021-05-06 ENCOUNTER — TELEPHONE (OUTPATIENT)
Dept: NEUROSURGERY | Facility: CLINIC | Age: 71
End: 2021-05-06

## 2021-05-06 NOTE — TELEPHONE ENCOUNTER
Caller: Tricia Cruz    Relationship to patient: Self    Best call back number:[phone]    Chief complaint: QUESTION     Type of visit: TELE VISIT      Additional notes:PT CALLED TO CANCEL HER TELE-VISIT ON 05/18/2021 SHE STATED SHE DOESN'T WANT TO PAY FOR THE VISIT TO ANSWER A SIMPLE QUESTION ABOUT WHAT ARE THE SYMPTOMS OF A SPINAL LEAK.     PLEASE CANCEL VISIT AND OR CALL PT BACK TO ADVISE     THANK YOU

## 2021-05-10 ENCOUNTER — TELEPHONE (OUTPATIENT)
Dept: INTERNAL MEDICINE | Age: 71
End: 2021-05-10

## 2021-05-10 NOTE — TELEPHONE ENCOUNTER
Caller: Tricia Cruz    Relationship: Self    Best call back number: [phone]    What medication are you requesting: SOMETHING FOR NAUSEA AND EAR PAIN     What are your current symptoms: EAR PAIN AND NAUSEA    How long have you been experiencing symptoms: FEW DAYS     If a prescription is needed, what is your preferred pharmacy and phone number:  [facility] [address] - [phone]  - [phone] FX     Additional notes: THERE WAS NO APPOINTMENTS AVAILABLE UNTIL Friday

## 2021-05-10 NOTE — TELEPHONE ENCOUNTER
MERLIN pt and she states she has already been to see an ENT. I explained that's about all the options we have since she doesn't want to take an antibiotic. Patient asked if she could have a lesser mg dosage of amoxicillin she is willing to try that?     PLEASE ADVISE

## 2021-05-10 NOTE — TELEPHONE ENCOUNTER
SW pt she states she cannot and will not take antibiotics. She was seen at  on 05.04.21 and given penicillin and zofran. She states she did not  penicillin but did the zofran, however it is not helping. I explained since she will not take an antibiotic I'm not sure what we can do for her.  Could we send her to an ENT?     PLEASE ADVISE

## 2021-05-18 ENCOUNTER — HOSPITAL ENCOUNTER (EMERGENCY)
Facility: HOSPITAL | Age: 71
Discharge: LEFT BEFORE DISCHARGE | End: 2021-05-18

## 2021-05-18 VITALS
TEMPERATURE: 98.3 F | RESPIRATION RATE: 16 BRPM | HEART RATE: 67 BPM | OXYGEN SATURATION: 100 % | SYSTOLIC BLOOD PRESSURE: 125 MMHG | HEIGHT: 67 IN | BODY MASS INDEX: 20.36 KG/M2 | DIASTOLIC BLOOD PRESSURE: 56 MMHG

## 2021-05-18 PROCEDURE — 99211 OFF/OP EST MAY X REQ PHY/QHP: CPT

## 2021-05-19 NOTE — TELEPHONE ENCOUNTER
"Pt called complaining of possible spinal leak. She does not have any symptoms of a CSF leak and has seen and ENT.  She had a follow up appointment scheduled for yesterday (5/18/21)with Gladys but said she was out of town. She is concerned that back pain is increasing and she has been looking online at the \"possibilities\".     She is asking to have a new MRI because of increased buttock pain and a follow up with you. Please advise.     Ok to leave a vm with this information on it per patient.   "

## 2021-05-21 ENCOUNTER — OFFICE VISIT (OUTPATIENT)
Dept: NEUROSURGERY | Facility: CLINIC | Age: 71
End: 2021-05-21

## 2021-05-21 DIAGNOSIS — R51.9 PERSISTENT HEADACHES: ICD-10-CM

## 2021-05-21 DIAGNOSIS — M79.606 LOW BACK PAIN RADIATING TO LOWER EXTREMITY: ICD-10-CM

## 2021-05-21 DIAGNOSIS — G96.191 TARLOV CYST: Primary | ICD-10-CM

## 2021-05-21 DIAGNOSIS — M54.50 LOW BACK PAIN RADIATING TO LOWER EXTREMITY: ICD-10-CM

## 2021-05-21 PROCEDURE — 99442 PR PHYS/QHP TELEPHONE EVALUATION 11-20 MIN: CPT | Performed by: NEUROLOGICAL SURGERY

## 2021-05-21 RX ORDER — ONDANSETRON HYDROCHLORIDE 8 MG/1
TABLET, FILM COATED ORAL
COMMUNITY
Start: 2021-05-14 | End: 2021-08-31

## 2021-05-21 NOTE — PROGRESS NOTES
Subjective   Patient ID: Tricia Cruz is a 71 y.o. female is here today for follow-up.    She was seen last in office 2/22/21 for R buttock and foot pain. She was to continue her Gabapentin.    You have chosen to receive care through a telephone visit. Do you consent to use a telephone visit for your medical care today? Yes    She complains of nausea HA and ear pains.  She had some eye pain but that has gradually resolved over the past few days. She has been to the ENT (Dr.Eric Hamlin) and was told it is not a ENT issue. Dr. Hamlin suggested a CT of sinuses for further evaluation, she did not want to do that.  She tried Ibuprofen yesterday which did offer relief. She also takes Gabapentin 1200 mg QD.     She states she has stopped all of her sinus medications.     This was a televisit from the hospital lasting 11 minutes.  Have known her for several years as she has a Tarlov cyst that is symptomatic in the sense that it causes radiculopathy in the S2 distribution but I have never encouraged her to have any kind of surgery because of always thought that surgery was not likely to resolve her symptoms.  She has been on gabapentin which helps modestly.  She says for the last 3 weeks, she has begun having headaches which is unusual for her.  She was evaluated at a urgent care center and she was told she had sinusitis but an ENT doctor said her sinuses were completely clear.  She says that it seems to be a little bit better when she lies down but she does not give a convincing history of this being a positional or spinal headache.  She took ibuprofen yesterday and it seems to be helping.  I told her to sit tight right now and will reevaluate by televisit in 2 weeks.  Her right buttock, leg pain, and peroneal numbness are about the same which is no doubt from the cyst.  If it becomes a bit more convincing by history that she is actually having spinal headaches then I would actually get a brain MRI with and without  contrast to look for meningeal enhancement and probably just repeat the lumbar MRI to see if there is been any change in the morphology of the known Tarlov cyst.  But will decide this in 2 weeks at the next televisit.    History of Present Illness    The following portions of the patient's history were reviewed and updated as appropriate: allergies, current medications, past family history, past medical history, past social history, past surgical history and problem list.    Review of Systems   Eyes: Positive for visual disturbance.   Gastrointestinal: Positive for nausea. Negative for vomiting.   Musculoskeletal: Negative for back pain.   Neurological: Positive for headaches. Negative for dizziness, weakness and light-headedness.           Objective     There were no vitals filed for this visit.  There is no height or weight on file to calculate BMI.      Physical Exam   Deferred  Neurologic Exam   Deferred        Assessment/Plan   Independent Review of Radiographic Studies:      I personally reviewed the images from the following studies.    I reviewed the lumbar MRI done in May 2020 which shows a Tarlov cyst in the sacrum is quite prominent and mild disc disease at L3-L4, L4-L5, and L5-S1.  Agree with the report    Medical Decision Making:      Since she is on improving trend, I told her to continue her ibuprofen and will do a televisit in 2 weeks.  If there is some worsening of her headaches and if they take on a character more of a positional headache or a spinal headache then we will probably go ahead and repeat her lumbar MRI and a brain MRI with and without contrast looking for meningeal enhancement.      Diagnoses and all orders for this visit:    1. Tarlov cyst (Primary)    2. Low back pain radiating to lower extremity    3. Persistent headaches      Return in about 2 weeks (around 6/4/2021) for Televisit.

## 2021-05-24 ENCOUNTER — TELEPHONE (OUTPATIENT)
Dept: NEUROSURGERY | Facility: CLINIC | Age: 71
End: 2021-05-24

## 2021-05-24 NOTE — TELEPHONE ENCOUNTER
Spoke to patient concerning about and ask to call insurance to make sure telephone visit  would be paid .    Mailed appointment reminder

## 2021-06-08 NOTE — PROGRESS NOTES
Subjective   Patient ID: Tricia Cruz is a 71 y.o. female is here today for follow-up.Patient was last seen 5/21/21 for Tarlov cyst.    You have chosen to receive care through a telephone visit. Do you consent to use a telephone visit for your medical care today? Yes    Today, She states her HA are better since last visit. She takes Ibuprofen prn for pain.     This was a televisit from my office lasting 8 minutes. The patient was at home. The positional headaches seem to get better with ibuprofen. I seriously doubt that they were true spinal headaches related to a leaking Tarlov cyst. She is back to her baseline in terms of that. It is mainly the right foot that bothers her, which I thought was related to the Tarlov cyst, which we are treating nonoperatively. She takes gabapentin at 600 mg b.i.d. I give that to her, so I have to follow her. She has a visit in November, which I asked her to keep. Again, I have stressed that I did not recommend any surgery for her Tarlov cyst.         History of Present Illness    The following portions of the patient's history were reviewed and updated as appropriate: allergies, current medications, past family history, past medical history, past social history, past surgical history and problem list.    Review of Systems   Eyes: Negative for visual disturbance.   Neurological: Negative for dizziness, facial asymmetry, speech difficulty, light-headedness, numbness and headaches.           Objective     There were no vitals filed for this visit.  There is no height or weight on file to calculate BMI.      Physical Exam   deferred  Neurologic Exam   deferred        Assessment/Plan   Independent Review of Radiographic Studies:      I personally reviewed the images from the following studies.    I reviewed the lumbar MRI done in May 2020 which shows a Tarlov cyst in the sacrum is quite prominent and mild disc disease at L3-L4, L4-L5, and L5-S1.  Agree with the report    Medical  Decision Making:      Doing better with regards to the headache. She will continue the gabapentin at 600 mg b.i.d. She has a scheduled visit in November of this year which she will keep.       Diagnoses and all orders for this visit:    1. Tarlov cyst (Primary)    2. Persistent headaches    3. DDD (degenerative disc disease), lumbar      Return in about 5 months (around 11/22/2021) for alread has a visit on this day.

## 2021-06-09 ENCOUNTER — OFFICE VISIT (OUTPATIENT)
Dept: NEUROSURGERY | Facility: CLINIC | Age: 71
End: 2021-06-09

## 2021-06-09 DIAGNOSIS — G96.191 TARLOV CYST: Primary | ICD-10-CM

## 2021-06-09 DIAGNOSIS — R51.9 PERSISTENT HEADACHES: ICD-10-CM

## 2021-06-09 DIAGNOSIS — M51.36 DDD (DEGENERATIVE DISC DISEASE), LUMBAR: ICD-10-CM

## 2021-06-09 PROCEDURE — 99441 PR PHYS/QHP TELEPHONE EVALUATION 5-10 MIN: CPT | Performed by: NEUROLOGICAL SURGERY

## 2021-07-26 DIAGNOSIS — E03.9 ACQUIRED HYPOTHYROIDISM: ICD-10-CM

## 2021-07-27 RX ORDER — LEVOTHYROXINE SODIUM 0.05 MG/1
TABLET ORAL
Qty: 90 TABLET | Refills: 3 | Status: SHIPPED | OUTPATIENT
Start: 2021-07-27 | End: 2022-07-26 | Stop reason: SDUPTHER

## 2021-08-31 ENCOUNTER — OFFICE VISIT (OUTPATIENT)
Dept: INTERNAL MEDICINE | Age: 71
End: 2021-08-31

## 2021-08-31 VITALS
OXYGEN SATURATION: 99 % | DIASTOLIC BLOOD PRESSURE: 80 MMHG | HEART RATE: 86 BPM | BODY MASS INDEX: 21.35 KG/M2 | HEIGHT: 67 IN | TEMPERATURE: 96.8 F | WEIGHT: 136 LBS | SYSTOLIC BLOOD PRESSURE: 130 MMHG

## 2021-08-31 DIAGNOSIS — M51.36 DDD (DEGENERATIVE DISC DISEASE), LUMBAR: ICD-10-CM

## 2021-08-31 DIAGNOSIS — G96.191 TARLOV CYST: ICD-10-CM

## 2021-08-31 DIAGNOSIS — N39.0 RECURRENT UTI: ICD-10-CM

## 2021-08-31 DIAGNOSIS — E03.9 ACQUIRED HYPOTHYROIDISM: Primary | ICD-10-CM

## 2021-08-31 PROCEDURE — 99214 OFFICE O/P EST MOD 30 MIN: CPT | Performed by: NURSE PRACTITIONER

## 2021-08-31 NOTE — PROGRESS NOTES
"Chief Complaint  Hypothyroidism and Urinary Frequency (recurrent, history Tarlov cyst, bladder issues )    Subjective          Tricia Cruz presents to [facility]  Urinary Frequency   This is a chronic problem. The patient is experiencing no pain. There has been no fever. Associated symptoms include frequency and urgency. Pertinent negatives include no hematuria. Her past medical history is significant for recurrent UTIs.   Thyroid Problem  Presents for follow-up visit. Patient reports no constipation, diarrhea, fatigue, weight gain or weight loss. The symptoms have been stable.       Objective   Vital Signs:   /80   Pulse 86   Temp 96.8 °F (36 °C) (Temporal)   Ht 170.2 cm (67.01\")   Wt 61.7 kg (136 lb)   SpO2 99%   BMI 21.30 kg/m²     Physical Exam  Vitals and nursing note reviewed.   Constitutional:       General: She is not in acute distress.     Appearance: She is well-developed. She is not ill-appearing.   Cardiovascular:      Rate and Rhythm: Normal rate and regular rhythm.      Heart sounds: Normal heart sounds, S1 normal and S2 normal. No murmur heard.     Pulmonary:      Effort: Pulmonary effort is normal.      Breath sounds: Normal breath sounds. No decreased breath sounds, wheezing, rhonchi or rales.   Skin:     General: Skin is warm and dry.   Neurological:      Mental Status: She is alert and oriented to person, place, and time.   Psychiatric:         Speech: Speech normal.         Behavior: Behavior normal. Behavior is cooperative.         Thought Content: Thought content normal.         Judgment: Judgment normal.        Result Review :   The following data was reviewed by: JAY Muse on 08/31/2021:  Common labs    Common Labsle 2/2/21 2/2/21 2/2/21    1104 1104 1104   Glucose  82    BUN  22    Creatinine  1.01 (A)    eGFR Non  Am  55 (A)    eGFR African Am  66    Sodium  140    Potassium  4.3    Chloride  102    Calcium  9.6    Total " Protein  6.9    Albumin  4.70    Total Bilirubin  0.6    Alkaline Phosphatase  72    AST (SGOT)  25    ALT (SGPT)  18    WBC 3.92     Hemoglobin 13.0     Hematocrit 39.2     Platelets 147     Total Cholesterol   209 (A)   Triglycerides   53   HDL Cholesterol   93 (A)   LDL Cholesterol    106 (A)   (A) Abnormal value       Comments are available for some flowsheets but are not being displayed.                  Assessment and Plan    Diagnoses and all orders for this visit:    1. Acquired hypothyroidism (Primary)  -     TSH+Free T4  -     Basic metabolic panel    2. DDD (degenerative disc disease), lumbar    3. Tarlov cyst    4. Recurrent UTI  -     Urinalysis With Culture If Indicated - Urine, Clean Catch        Follow Up   Return in about 6 months (around 2/28/2022) for Next scheduled follow up.  Patient was given instructions and counseling regarding her condition or for health maintenance advice. Please see specific information pulled into the AVS if appropriate.

## 2021-09-01 LAB
APPEARANCE UR: CLEAR
BACTERIA #/AREA URNS HPF: NORMAL /HPF
BILIRUB UR QL STRIP: NEGATIVE
BUN SERPL-MCNC: 21 MG/DL (ref 8–23)
BUN/CREAT SERPL: 23.3 (ref 7–25)
CALCIUM SERPL-MCNC: 9.7 MG/DL (ref 8.6–10.5)
CASTS URNS QL MICRO: NORMAL /LPF
CHLORIDE SERPL-SCNC: 104 MMOL/L (ref 98–107)
CO2 SERPL-SCNC: 27.1 MMOL/L (ref 22–29)
COLOR UR: YELLOW
CREAT SERPL-MCNC: 0.9 MG/DL (ref 0.57–1)
EPI CELLS #/AREA URNS HPF: NORMAL /HPF (ref 0–10)
GLUCOSE SERPL-MCNC: 92 MG/DL (ref 65–99)
GLUCOSE UR QL: NEGATIVE
HGB UR QL STRIP: NEGATIVE
KETONES UR QL STRIP: NEGATIVE
LEUKOCYTE ESTERASE UR QL STRIP: NEGATIVE
MICRO URNS: NORMAL
MICRO URNS: NORMAL
NITRITE UR QL STRIP: NEGATIVE
PH UR STRIP: 6.5 [PH] (ref 5–7.5)
POTASSIUM SERPL-SCNC: 4.2 MMOL/L (ref 3.5–5.2)
PROT UR QL STRIP: NEGATIVE
RBC #/AREA URNS HPF: NORMAL /HPF (ref 0–2)
SODIUM SERPL-SCNC: 145 MMOL/L (ref 136–145)
SP GR UR: 1.01 (ref 1–1.03)
T4 FREE SERPL-MCNC: 1.12 NG/DL (ref 0.93–1.7)
TSH SERPL DL<=0.005 MIU/L-ACNC: 2.38 UIU/ML (ref 0.27–4.2)
URINALYSIS REFLEX: NORMAL
UROBILINOGEN UR STRIP-MCNC: 0.2 MG/DL (ref 0.2–1)
WBC #/AREA URNS HPF: NORMAL /HPF (ref 0–5)

## 2021-12-17 NOTE — PROGRESS NOTES
"Subjective   Patient ID: Tricia Cruz is a 71 y.o. female is here today for follow-up.    She was last seen 6/9/21 for Tarlov cyst, persistent headaches, and Degenerative disc disease.    Patient reports today the only time she has pain is when she gets up in the morning.  Her last MRI was 4/20.    This very nice lady has a Tarlov cyst in the sacrum that I been following and managing medically with gabapentin.  She is actually been doing quite well and does have her symptoms which I think are lessened in severity by the gabapentin at 600 mg twice daily.  She is not having any side effects.  She feels functional.  She does have the tingling in the private areas and down the right leg and into the heel but they wax and wane and she does not have any overwhelming pain.  I told her that this is why I recommended not operating on this and that medical management is the best option.  We will continue to provide the gabapentin but I do need to see her from time to time.  We will make it in 1 year.        History of Present Illness    The following portions of the patient's history were reviewed and updated as appropriate: allergies, current medications, past family history, past medical history, past social history, past surgical history and problem list.    Review of Systems   Constitutional: Negative for fever.   All other systems reviewed and are negative.          Objective     Vitals:    12/18/21 0948   BP: 118/72   Pulse: 85   Temp: 97.9 °F (36.6 °C)   SpO2: 100%   Weight: 64.7 kg (142 lb 9.6 oz)   Height: 170.2 cm (67.01\")     Body mass index is 22.33 kg/m².      Physical Exam  Constitutional:       Appearance: She is well-developed.   HENT:      Head: Normocephalic and atraumatic.   Eyes:      Extraocular Movements: EOM normal.      Conjunctiva/sclera: Conjunctivae normal.      Pupils: Pupils are equal, round, and reactive to light.   Neck:      Vascular: No carotid bruit.   Neurological:      Mental Status: She " is oriented to person, place, and time.      Coordination: Finger-Nose-Finger Test and Heel to Shin Test normal.      Gait: Gait is intact.      Deep Tendon Reflexes:      Reflex Scores:       Tricep reflexes are 2+ on the right side and 2+ on the left side.       Bicep reflexes are 2+ on the right side and 2+ on the left side.       Brachioradialis reflexes are 2+ on the right side and 2+ on the left side.       Patellar reflexes are 2+ on the right side and 2+ on the left side.       Achilles reflexes are 2+ on the right side and 2+ on the left side.  Psychiatric:         Speech: Speech normal.       Neurologic Exam     Mental Status   Oriented to person, place, and time.   Registration of memory: Good recent and remote memory.   Attention: normal. Concentration: normal.   Speech: speech is normal   Level of consciousness: alert  Knowledge: consistent with education.     Cranial Nerves     CN II   Visual fields full to confrontation.   Visual acuity: normal    CN III, IV, VI   Pupils are equal, round, and reactive to light.  Extraocular motions are normal.     CN V   Facial sensation intact.   Right corneal reflex: normal  Left corneal reflex: normal    CN VII   Facial expression full, symmetric.   Right facial weakness: none  Left facial weakness: none    CN VIII   Hearing: intact    CN IX, X   Palate: symmetric    CN XI   Right sternocleidomastoid strength: normal  Left sternocleidomastoid strength: normal    CN XII   Tongue: not atrophic  Tongue deviation: none    Motor Exam   Muscle bulk: normal  Right arm tone: normal  Left arm tone: normal  Right leg tone: normal  Left leg tone: normal    Strength   Strength 5/5 except as noted.     Sensory Exam   Light touch normal.     Gait, Coordination, and Reflexes     Gait  Gait: normal    Coordination   Finger to nose coordination: normal  Heel to shin coordination: normal    Reflexes   Right brachioradialis: 2+  Left brachioradialis: 2+  Right biceps: 2+  Left  biceps: 2+  Right triceps: 2+  Left triceps: 2+  Right patellar: 2+  Left patellar: 2+  Right achilles: 2+  Left achilles: 2+  Right : 2+  Left : 2+          Assessment/Plan   Independent Review of Radiographic Studies:      I personally reviewed the images from the following studies.    I reviewed the lumbar MRI done in May 2020 which shows a Tarlov cyst in the sacrum is quite prominent and mild disc disease at L3-L4, L4-L5, and L5-S1.  Agree with the report    Medical Decision Making:      Stable right now.  We will continue prescribing the gabapentin 600 twice daily and follow electronically.  I will see her in 1 year.  If symptoms change or are exacerbated, she will let us know.      Diagnoses and all orders for this visit:    1. Tarlov cyst (Primary)      Return in about 1 year (around 12/18/2022) for Face-to-face.

## 2021-12-18 ENCOUNTER — OFFICE VISIT (OUTPATIENT)
Dept: NEUROSURGERY | Facility: CLINIC | Age: 71
End: 2021-12-18

## 2021-12-18 VITALS
TEMPERATURE: 97.9 F | WEIGHT: 142.6 LBS | HEART RATE: 85 BPM | BODY MASS INDEX: 22.38 KG/M2 | SYSTOLIC BLOOD PRESSURE: 118 MMHG | OXYGEN SATURATION: 100 % | DIASTOLIC BLOOD PRESSURE: 72 MMHG | HEIGHT: 67 IN

## 2021-12-18 DIAGNOSIS — G96.191 TARLOV CYST: Primary | ICD-10-CM

## 2021-12-18 PROCEDURE — 99213 OFFICE O/P EST LOW 20 MIN: CPT | Performed by: NEUROLOGICAL SURGERY

## 2022-03-07 ENCOUNTER — OFFICE VISIT (OUTPATIENT)
Dept: INTERNAL MEDICINE | Age: 72
End: 2022-03-07

## 2022-03-07 VITALS
OXYGEN SATURATION: 99 % | HEART RATE: 70 BPM | WEIGHT: 145.2 LBS | HEIGHT: 67 IN | DIASTOLIC BLOOD PRESSURE: 78 MMHG | SYSTOLIC BLOOD PRESSURE: 126 MMHG | BODY MASS INDEX: 22.79 KG/M2 | TEMPERATURE: 96.8 F

## 2022-03-07 DIAGNOSIS — R10.2 PELVIC PAIN: ICD-10-CM

## 2022-03-07 DIAGNOSIS — G96.191 TARLOV CYST: Primary | ICD-10-CM

## 2022-03-07 DIAGNOSIS — E03.9 HYPOTHYROIDISM, UNSPECIFIED TYPE: ICD-10-CM

## 2022-03-07 DIAGNOSIS — M54.50 CHRONIC LOW BACK PAIN, UNSPECIFIED BACK PAIN LATERALITY, UNSPECIFIED WHETHER SCIATICA PRESENT: ICD-10-CM

## 2022-03-07 DIAGNOSIS — G89.29 CHRONIC LOW BACK PAIN, UNSPECIFIED BACK PAIN LATERALITY, UNSPECIFIED WHETHER SCIATICA PRESENT: ICD-10-CM

## 2022-03-07 PROBLEM — G43.009 MIGRAINE WITHOUT AURA, NOT REFRACTORY: Status: ACTIVE | Noted: 2022-03-07

## 2022-03-07 PROCEDURE — 99214 OFFICE O/P EST MOD 30 MIN: CPT | Performed by: NURSE PRACTITIONER

## 2022-03-07 NOTE — PROGRESS NOTES
"    I N T E R N A L  M E D I C I N E  PERLA GELLER, APRN      ENCOUNTER DATE:  03/07/2022    Tricia Cruz / 71 y.o. / female      CHIEF COMPLAINT / REASON FOR OFFICE VISIT     Hypothyroidism (Est Care) and Pelvic Pain (Chronic tarlov cyst )      ASSESSMENT & PLAN     1. Tarlov cyst  -Continue management with neurosurgery  - MRI pelvis wo contrast; Future    2. Pelvic pain  - MRI pelvis wo contrast; Future    3. Chronic low back pain, unspecified back pain laterality, unspecified whether sciatica present  - MRI pelvis wo contrast; Future  -Gabapentin as prescribed by neurosurgery    4. Hypothyroidism, unspecified type  -Continue levothyroxine 50 MCG daily  - CBC & Differential  - Comprehensive Metabolic Panel  - Lipid Panel With / Chol / HDL Ratio  - TSH+Free T4    Orders Placed This Encounter   Procedures   • MRI pelvis wo contrast   • Comprehensive Metabolic Panel   • Lipid Panel With / Chol / HDL Ratio   • TSH+Free T4   • CBC & Differential     No orders of the defined types were placed in this encounter.      SUMMARY/DISCUSSION  • Follow-up in 6 months for chronic medical Medicare wellness, earlier if needed  • Discussed need for pneumonia shot.  Would like to see if pharmacy has Prevnar 20 or 15 before proceeding.  Currently our office only has Pneumovax 23 or Prevnar 13.    Next Appointment with me: Visit date not found    Return in about 6 months (around 9/7/2022) for Medicare Wellness.      VITAL SIGNS     Visit Vitals  /78   Pulse 70   Temp 96.8 °F (36 °C) (Temporal)   Ht 170.2 cm (67.01\")   Wt 65.9 kg (145 lb 3.2 oz)   SpO2 99%   BMI 22.73 kg/m²       Wt Readings from Last 3 Encounters:   03/07/22 65.9 kg (145 lb 3.2 oz)   12/18/21 64.7 kg (142 lb 9.6 oz)   08/31/21 61.7 kg (136 lb)     Body mass index is 22.73 kg/m².      MEDICATIONS AT THE TIME OF OFFICE VISIT     Current Outpatient Medications on File Prior to Visit   Medication Sig   • gabapentin (NEURONTIN) 600 MG tablet TAKE ONE TABLET BY MOUTH " THREE TIMES A DAY   • levothyroxine (SYNTHROID, LEVOTHROID) 50 MCG tablet TAKE ONE TABLET BY MOUTH DAILY   • Multiple Vitamin (MULTIVITAMIN) capsule Take 1 capsule by mouth Daily.     No current facility-administered medications on file prior to visit.         HISTORY OF PRESENT ILLNESS     Patient presents to establish care with new provider in office.  Previous patient of Sakina THOMPSON.    Hypothyroidism well-controlled with levothyroxine 50 MCG daily.  Mild elevation in LDL at 106 with normal blood pressure.  Denies any significant fatigue, unexplained weight gain, constipation.  Lab Results   Component Value Date    TSH 2.380 08/31/2021     Lab Results   Component Value Date    CHLPL 209 (H) 02/02/2021    TRIG 53 02/02/2021    HDL 93 (H) 02/02/2021     (H) 02/02/2021     Lab Results   Component Value Date    GLUCOSE 92 08/31/2021    BUN 21 08/31/2021    CREATININE 0.90 08/31/2021    EGFRIFNONA 62 08/31/2021    EGFRIFAFRI 75 08/31/2021    BCR 23.3 08/31/2021    K 4.2 08/31/2021    CO2 27.1 08/31/2021    CALCIUM 9.7 08/31/2021    PROTENTOTREF 6.9 02/02/2021    ALBUMIN 4.70 02/02/2021    LABIL2 2.1 02/02/2021    AST 25 02/02/2021    ALT 18 02/02/2021     Followed regularly by [facility] at [facility].  Scheduled for mammogram within the next month or 2.  Requesting DEXA scan.  Does not believe she has low density of the bones.  No vitamin D supplementation.  On a multivitamin.  [facility] performs routine Pap, pelvic exam.    Followed by neurosurgery for Tarlov cyst.  Previous MRI of the pelvis without contrast completed by [facility] imaging on May 11, 2020.  Stable left S2 sacral Tarlov cyst at that time.  Measured 2.1 cm x 2.7 cm x 3.4 cm.  Patient requesting updated MRI of the pelvis.  Taking gabapentin 600 mg 3 times daily with moderate relief.    Previous imaging in February 2021 was thought to be COPD on chest x-ray.  Pulmonary function test revealed normal.  No shortness of breath or  cough.    Low risk study for stress test due to abnormal EKG.  Updated February 2021.  No chest pain or heart palpitations.  No leg swelling.    REVIEW OF SYSTEMS     Constitutional neg except per HPI   Resp neg  CV neg  Musc chronic pelvic/lumbar pain     PHYSICAL EXAMINATION     Physical Exam  Constitutional  No distress  Cardiovascular Rate  normal . Rhythm: regular . Heart sounds:  normal  Pulmonary/Chest  Effort normal. Breath sounds:  normal  Psychiatric  Alert. Judgment and thought content normal. Mood normal       REVIEWED DATA     Labs:           Imaging:           Medical Tests:             Summary of old records / correspondence / consultant report:           Request outside records:           *Examiner was wearing medical surgical mask, face shield and exam gloves during the entire duration of the visit. Patient was masked the entire time.   Minimum social distance of 6 ft maintained entire visit except if physical contact was necessary as documented.     Dictated utilizing Dragon dictation

## 2022-03-08 LAB
ALBUMIN SERPL-MCNC: 4.6 G/DL (ref 3.8–4.8)
ALBUMIN/GLOB SERPL: 2.2 {RATIO} (ref 1.2–2.2)
ALP SERPL-CCNC: 85 IU/L (ref 44–121)
ALT SERPL-CCNC: 18 IU/L (ref 0–32)
AST SERPL-CCNC: 22 IU/L (ref 0–40)
BASOPHILS # BLD AUTO: 0 X10E3/UL (ref 0–0.2)
BASOPHILS NFR BLD AUTO: 0 %
BILIRUB SERPL-MCNC: 0.4 MG/DL (ref 0–1.2)
BUN SERPL-MCNC: 20 MG/DL (ref 8–27)
BUN/CREAT SERPL: 20 (ref 12–28)
CALCIUM SERPL-MCNC: 9.6 MG/DL (ref 8.7–10.3)
CHLORIDE SERPL-SCNC: 103 MMOL/L (ref 96–106)
CHOLEST SERPL-MCNC: 227 MG/DL (ref 100–199)
CHOLEST/HDLC SERPL: 2.4 RATIO (ref 0–4.4)
CO2 SERPL-SCNC: 25 MMOL/L (ref 20–29)
CREAT SERPL-MCNC: 1.01 MG/DL (ref 0.57–1)
EGFR GENE MUT ANL BLD/T: 60 ML/MIN/1.73
EOSINOPHIL # BLD AUTO: 0 X10E3/UL (ref 0–0.4)
EOSINOPHIL NFR BLD AUTO: 1 %
ERYTHROCYTE [DISTWIDTH] IN BLOOD BY AUTOMATED COUNT: 11.9 % (ref 11.7–15.4)
GLOBULIN SER CALC-MCNC: 2.1 G/DL (ref 1.5–4.5)
GLUCOSE SERPL-MCNC: 82 MG/DL (ref 65–99)
HCT VFR BLD AUTO: 36.4 % (ref 34–46.6)
HDLC SERPL-MCNC: 94 MG/DL
HGB BLD-MCNC: 11.9 G/DL (ref 11.1–15.9)
IMM GRANULOCYTES # BLD AUTO: 0 X10E3/UL (ref 0–0.1)
IMM GRANULOCYTES NFR BLD AUTO: 0 %
LDLC SERPL CALC-MCNC: 121 MG/DL (ref 0–99)
LYMPHOCYTES # BLD AUTO: 1.5 X10E3/UL (ref 0.7–3.1)
LYMPHOCYTES NFR BLD AUTO: 31 %
MCH RBC QN AUTO: 31.3 PG (ref 26.6–33)
MCHC RBC AUTO-ENTMCNC: 32.7 G/DL (ref 31.5–35.7)
MCV RBC AUTO: 96 FL (ref 79–97)
MONOCYTES # BLD AUTO: 0.6 X10E3/UL (ref 0.1–0.9)
MONOCYTES NFR BLD AUTO: 12 %
NEUTROPHILS # BLD AUTO: 2.7 X10E3/UL (ref 1.4–7)
NEUTROPHILS NFR BLD AUTO: 56 %
PLATELET # BLD AUTO: 163 X10E3/UL (ref 150–450)
POTASSIUM SERPL-SCNC: 4.6 MMOL/L (ref 3.5–5.2)
PROT SERPL-MCNC: 6.7 G/DL (ref 6–8.5)
RBC # BLD AUTO: 3.8 X10E6/UL (ref 3.77–5.28)
SODIUM SERPL-SCNC: 142 MMOL/L (ref 134–144)
T4 FREE SERPL-MCNC: 1.18 NG/DL (ref 0.82–1.77)
TRIGL SERPL-MCNC: 69 MG/DL (ref 0–149)
TSH SERPL DL<=0.005 MIU/L-ACNC: 2.57 UIU/ML (ref 0.45–4.5)
VLDLC SERPL CALC-MCNC: 12 MG/DL (ref 5–40)
WBC # BLD AUTO: 4.8 X10E3/UL (ref 3.4–10.8)

## 2022-07-26 DIAGNOSIS — E03.9 ACQUIRED HYPOTHYROIDISM: ICD-10-CM

## 2022-07-26 RX ORDER — LEVOTHYROXINE SODIUM 0.05 MG/1
50 TABLET ORAL DAILY
Qty: 90 TABLET | Refills: 3 | Status: SHIPPED | OUTPATIENT
Start: 2022-07-26

## 2022-08-15 RX ORDER — GABAPENTIN 600 MG/1
TABLET ORAL
Qty: 270 TABLET | Refills: 5 | Status: SHIPPED | OUTPATIENT
Start: 2022-08-15 | End: 2023-03-21 | Stop reason: SDUPTHER

## 2022-08-15 NOTE — TELEPHONE ENCOUNTER
Rx Refill Note  Requested Prescriptions     Pending Prescriptions Disp Refills   • gabapentin (NEURONTIN) 600 MG tablet [Pharmacy Med Name: GABAPENTIN 600 MG TABLET] 270 tablet      Sig: TAKE ONE TABLET BY MOUTH THREE TIMES A DAY      Last office visit with prescribing clinician: 12/18/2021      Next office visit with prescribing clinician: 12/19/2022            Eileen Rincon MA  08/15/22, 08:13 EDT

## 2022-09-19 ENCOUNTER — OFFICE VISIT (OUTPATIENT)
Dept: INTERNAL MEDICINE | Age: 72
End: 2022-09-19

## 2022-09-19 VITALS
HEIGHT: 67 IN | WEIGHT: 148.2 LBS | HEART RATE: 88 BPM | SYSTOLIC BLOOD PRESSURE: 130 MMHG | RESPIRATION RATE: 12 BRPM | OXYGEN SATURATION: 99 % | DIASTOLIC BLOOD PRESSURE: 76 MMHG | TEMPERATURE: 97.8 F | BODY MASS INDEX: 23.26 KG/M2

## 2022-09-19 DIAGNOSIS — M85.80 OSTEOPENIA, UNSPECIFIED LOCATION: ICD-10-CM

## 2022-09-19 DIAGNOSIS — E55.9 VITAMIN D DEFICIENCY: ICD-10-CM

## 2022-09-19 DIAGNOSIS — E78.5 HYPERLIPIDEMIA, UNSPECIFIED HYPERLIPIDEMIA TYPE: ICD-10-CM

## 2022-09-19 DIAGNOSIS — Z51.81 THERAPEUTIC DRUG MONITORING: ICD-10-CM

## 2022-09-19 DIAGNOSIS — G96.191 TARLOV CYST: ICD-10-CM

## 2022-09-19 DIAGNOSIS — E03.9 ACQUIRED HYPOTHYROIDISM: ICD-10-CM

## 2022-09-19 DIAGNOSIS — Z00.00 MEDICARE ANNUAL WELLNESS VISIT, SUBSEQUENT: Primary | ICD-10-CM

## 2022-09-19 PROCEDURE — 1170F FXNL STATUS ASSESSED: CPT | Performed by: NURSE PRACTITIONER

## 2022-09-19 PROCEDURE — G0439 PPPS, SUBSEQ VISIT: HCPCS | Performed by: NURSE PRACTITIONER

## 2022-09-19 PROCEDURE — 1125F AMNT PAIN NOTED PAIN PRSNT: CPT | Performed by: NURSE PRACTITIONER

## 2022-09-19 PROCEDURE — 1159F MED LIST DOCD IN RCRD: CPT | Performed by: NURSE PRACTITIONER

## 2022-09-19 NOTE — PROGRESS NOTES
"    I N T E R N A L  M E D I C I N E  JAY SINGH      ENCOUNTER DATE:  09/19/2022    Triciaomar Cruz / 72 y.o. / female        MEDICARE ANNUAL WELLNESS VISIT       Chief Complaint: Medicare Wellness-subsequent, Hypothyroidism, tarlov cyst, and osteopenia       Patient's general assessment of her health since a year ago:     - Compared to one year ago, she feels her physical health is about the same without significant change.    - Compared to one year ago, she feels her mental health is about the same without significant change.      HPI for other active medical problems:     Hypothyroidism well-controlled with levothyroxine 50 MCG daily, last TSH 2.570.  Denies any significant fatigue, unexplained weight gain, constipation.    Followed regularly by OB/GYN at [facility].  Scheduled for mammogram through their office.  DEXA scan March 2022 showed osteopenia, no vitamin D supplement at this time. [facility], pelvic exam.     Followed by neurosurgery for Tarlov cyst.  Previous MRI of the pelvis without contrast completed by [facility] imaging on May 11, 2020.  Stable left S2 sacral Tarlov cyst at that time.  Measured 2.1 cm x 2.7 cm x 3.4 cm.  Updated MRI of the pelvis March 2022 showing continued stability of the Tarlov cyst with no significant increase in size. Taking gabapentin 600 mg 3 times daily with moderate relief.    Colon cancer screening cologuard in March negative.     * The required components of Health Risk Assessment (HRA) that were completed by the patient and/or my staff are contained within this note and in the scanned documents titled \"Health Risk Assessment\" within the media section of the patient's chart in [facility].         HISTORY       Recent Hospitalizations:    Recent hospitalization?: No     If YES, location, date, and diagnoses:     · Location:   · Date:   · Principle Discharge Dx:   · Secondary Dx:       Patient Care Team:    Patient Care Team:  Azam Colon, " APRN as PCP - General (Internal Medicine)  Mary Fajardo MD as Consulting Physician (Obstetrics and Gynecology)  Hero Balderas MD as Surgeon (Neurosurgery)      Allergies:  Keflex [cephalexin] and Meloxicam    Medications:  Current Outpatient Medications on File Prior to Visit   Medication Sig Dispense Refill   • gabapentin (NEURONTIN) 600 MG tablet TAKE ONE TABLET BY MOUTH THREE TIMES A  tablet 5   • levothyroxine (SYNTHROID, LEVOTHROID) 50 MCG tablet Take 1 tablet by mouth Daily. 90 tablet 3   • Multiple Vitamin (MULTIVITAMIN) capsule Take 1 capsule by mouth Daily.       No current facility-administered medications on file prior to visit.        PFSH:     The following portions of the patient's history were reviewed and updated as appropriate: Allergies / Current Medications / Past Medical History / Surgical History / Social History / Family History    Problem List:  Patient Active Problem List   Diagnosis   • Low back pain radiating to lower extremity   • Heartburn   • Alopecia   • Fatigue   • Chest pain   • Anxiety   • Vulvodynia   • Tarlov cyst   • DDD (degenerative disc disease), lumbar   • Acquired hypothyroidism   • Gastroesophageal reflux disease without esophagitis   • Right groin pain   • Persistent headaches   • Migraine without aura, not refractory       Past Medical History:  Past Medical History:   Diagnosis Date   • Anxiety    • Arthritis     MRI Possibly Indicates   • Disease of thyroid gland    • Low back pain    • Pain in the groin, left 06/01/2018   • Pneumonia 2014   • Tarlov cyst        Past Surgical History:  History reviewed. No pertinent surgical history.    Social History:  Social History     Socioeconomic History   • Marital status:    • Number of children: 0   • Years of education: MA   Tobacco Use   • Smoking status: Never Smoker   • Smokeless tobacco: Never Used   Vaping Use   • Vaping Use: Never used   Substance and Sexual Activity   • Alcohol use: No   • Drug  "use: No   • Sexual activity: Not Currently     Partners: Male       Family History:  Family History   Problem Relation Age of Onset   • Prostate cancer Father    • Heart disease Father    • Heart attack Father    • Cancer Sister         Sarcoma   • Hypertension Sister    • Thyroid disease Sister    • Atrial fibrillation Sister    • Lymphoma Brother    • No Known Problems Mother    • Heart disease Maternal Grandmother    • Stroke Maternal Grandmother          PATIENT ASSESSMENT     Vitals:  /76   Pulse 88   Temp 97.8 °F (36.6 °C)   Resp 12   Ht 170.2 cm (67.01\")   Wt 67.2 kg (148 lb 3.2 oz)   SpO2 99%   BMI 23.20 kg/m²   BP Readings from Last 3 Encounters:   09/19/22 130/76   03/07/22 126/78   12/18/21 118/72     Wt Readings from Last 3 Encounters:   09/19/22 67.2 kg (148 lb 3.2 oz)   03/07/22 65.9 kg (145 lb 3.2 oz)   12/18/21 64.7 kg (142 lb 9.6 oz)      Body mass index is 23.2 kg/m².    Pain Score    09/19/22 0927   PainSc: 3  Comment: Tarlov cyst         Review of Systems:    Review of Systems  Constitutional neg except per HPI   Resp neg  CV neg  Musc chronic stable pelvic, lumbar pain     Physical Exam:    Physical Exam  Constitutional  No distress  Cardiovascular Rate  normal . Rhythm: regular . Heart sounds:  normal  Pulmonary/Chest  Effort normal. Breath sounds:  normal  Psychiatric  Alert. Judgment and thought content normal. Mood normal     Reviewed Data:    Labs:   Lab Results   Component Value Date     03/07/2022    K 4.6 03/07/2022    CALCIUM 9.6 03/07/2022    AST 22 03/07/2022    ALT 18 03/07/2022    BUN 20 03/07/2022    CREATININE 1.01 (H) 03/07/2022    CREATININE 0.90 08/31/2021    CREATININE 1.01 (H) 02/02/2021    EGFRIFNONA 62 08/31/2021    EGFRIFAFRI 75 08/31/2021       No results found for: GLU, HGBA1C, MICROALBUR    Lab Results   Component Value Date     (H) 03/07/2022     (H) 02/02/2021    LDL 73 06/28/2019    HDL 94 03/07/2022    TRIG 69 03/07/2022    " CHOLHDLRATIO 2.4 03/07/2022       Lab Results   Component Value Date    TSH 2.570 03/07/2022    FREET4 1.18 03/07/2022          Lab Results   Component Value Date    WBC 4.8 03/07/2022    HGB 11.9 03/07/2022    HGB 13.0 02/02/2021    HGB 12.1 07/09/2020     03/07/2022                 No results found for: PSA    Imaging:          Medical Tests:          Screening for Glaucoma:  Previous screening for glaucoma?: Yes      Hearing Loss Screen:  Finger Rub Hearing Test (right ear): passed  Finger Rub Hearing Test (left ear): passed      Urinary Incontinence Screen:  Episodes of urinary incontinence? : No      Depression Screen:  PHQ-2/PHQ-9 Depression Screening 9/19/2022   Retired Total Score -   Little Interest or Pleasure in Doing Things 0-->not at all   Feeling Down, Depressed or Hopeless 0-->not at all   PHQ-9: Brief Depression Severity Measure Score 0        PHQ-2: 0 (Not depressed)    PHQ-9: 0 (Negative screening for depression)       FUNCTIONAL, FALL RISK, & COGNITIVE SCREENING (Components below):    DATA:    Functional & Cognitive Status 9/19/2022   Do you have difficulty preparing food and eating? No   Do you have difficulty bathing yourself, getting dressed or grooming yourself? No   Do you have difficulty using the toilet? No   Do you have difficulty moving around from place to place? No   Do you have trouble with steps or getting out of a bed or a chair? No   Current Diet Limited Junk Food   Dental Exam Up to date   Eye Exam Not up to date   Exercise (times per week) 7 times per week   Current Exercises Include Walking   Do you need help using the phone?  No   Are you deaf or do you have serious difficulty hearing?  No   Do you need help with transportation? No   Do you need help shopping? No   Do you need help preparing meals?  No   Do you need help with housework?  No   Do you need help with laundry? No   Do you need help taking your medications? No   Do you need help managing money? No   Do you  ever drive or ride in a car without wearing a seat belt? No   Have you felt unusual stress, anger or loneliness in the last month? No   Who do you live with? Spouse   If you need help, do you have trouble finding someone available to you? No   Have you been bothered in the last four weeks by sexual problems? Yes   Do you have difficulty concentrating, remembering or making decisions? No         A) Assessment of Functional Ability:  (Assessment of ability to perform ADL's (showering/bathing, using toilet, dressing, feeding self, moving self around) and IADL's (use telephone, shop, prepare food, housekeep, do laundry, transport independently, take medications independently, and handle finances)    Degree of functional impairment: NONE (based on assessment noted above)      B) Assessment of Fall Risk:  Fall Risk Assessment was completed, and patient is at low risk for falls.       Need for further evaluation of gait, strength, and balance? : No    Timed Up and Go (TUG):   (>= 12 seconds indicates high risk for falling)    Observable abnormalities included: Normal gait pattern       C. Assessment of Cognitive Function:    Mini-Cog Test:     1) Registration (3 objects): Yes   2) Number of objects recalled: 3   3) Clock Draw: Passed? : Yes       Further evaluation required? : No        COUNSELING       A. Identification of Health Risk Factors:    Risk factors include: cardiovascular risk factors and depression / other psychiatric problems      B. Age-Appropriate Screening Schedule:  (Refer to the list below for future screening recommendations based on patient's age, sex and/or medical conditions. Orders for these recommended tests are listed in the plan section. The patient has been provided with a written plan)    Health Maintenance Topics  Health Maintenance   Topic Date Due   • MAMMOGRAM  09/19/2022 (Originally 3/3/2022)   • TDAP/TD VACCINES (1 - Tdap) 09/19/2022 (Originally 3/10/1969)   • ZOSTER VACCINE (1 of 2)  09/19/2023 (Originally 3/10/2002)   • INFLUENZA VACCINE  10/01/2022   • LIPID PANEL  03/07/2023   • DXA SCAN  03/08/2024       Health Maintenance Topics Due or Over-Due  There are no preventive care reminders to display for this patient.      C. Advanced Care Planning:    Advance Care Planning   Declines further assitance      D. Patient Self-Management and Personalized Health Advice:    She has been provided with personalized counseling/information (including brochures/handouts) about:     -- optimizing diet/nutrition plans      She has been recommended for the following preventative services which has been performed today, will be ordered today or ordered/performed on upcoming follow-up visit:     -- NUTRITION counseling provided, EXERCISE counseling provided, EYE exam for glaucoma screening recommended, CARDIOVASCULAR disease risk reduction counseling performed, FALL RISK assessment / plan of care completed, URINARY incontinence assessment done, OSTEOPOROSIS screening DISCUSSED, BREAST CANCER screening DISCUSSED, CERVICAL CANCER screening DISCUSSED , vaccination for PNEUMOVAX/PCV administered (or recommended), vaccination for Tdap / Td administered (or recommended), vaccination for SHINGRIX administered  (or recommended)      E. Miscellaneous Items:    -Aspirin use counseling: Does not need ASA (and currently is not on it)    -Discussed BMI with her. The BMI is in the acceptable range    -Reviewed use of high risk medication in the elderly: YES    -Reviewed for potential of harmful drug interactions in the elderly: YES        WRAP UP       Assessment & Plan:  1) MEDICARE ANNUAL WELLNESS VISIT    2) OTHER MEDICAL CONDITIONS ADDRESSED TODAY:            Problem List Items Addressed This Visit        Endocrine and Metabolic    Acquired hypothyroidism    Relevant Medications    levothyroxine (SYNTHROID, LEVOTHROID) 50 MCG tablet    Other Relevant Orders    CBC & Differential    Comprehensive Metabolic Panel     TSH+Free T4    Urinalysis With Culture If Indicated - Urine, Clean Catch       Neuro    Tarlov cyst    Overview     Dr Nelson         Relevant Orders    Compliance Drug Analysis, Ur - Urine, Clean Catch      Other Visit Diagnoses     Medicare annual wellness visit, subsequent    -  Primary    Osteopenia, unspecified location        Relevant Orders    Vitamin D 25 Hydroxy    Therapeutic drug monitoring        Relevant Orders    Compliance Drug Analysis, Ur - Urine, Clean Catch    Vitamin D deficiency        Relevant Orders    Vitamin D 25 Hydroxy    Hyperlipidemia, unspecified hyperlipidemia type        Relevant Orders    Lipid Panel With / Chol / HDL Ratio                    Orders Placed This Encounter   Procedures   • Comprehensive Metabolic Panel   • Lipid Panel With / Chol / HDL Ratio   • TSH+Free T4   • Vitamin D 25 Hydroxy   • Compliance Drug Analysis, Ur - Urine, Clean Catch   • Urinalysis With Culture If Indicated - Urine, Clean Catch   • CBC & Differential       Discussion / Summary:  · Declines all vaccinations today   · up-to-date on health maintenance pelvic exam which is performed by OB/GYN, scheduled  · Continue current medication regimen of levothyroxine for hypothyroidism  · Continue gabapentin gabapentin for tarlov cyst pain, urine drug compliance reviewed today, controlled substance contract updated, PDMP reviewed   · follow-up in 6 months for chronic medical, earlier if needed, 1 year Medicare wellness    Medications as of TODAY:              Current Outpatient Medications   Medication Sig Dispense Refill   • gabapentin (NEURONTIN) 600 MG tablet TAKE ONE TABLET BY MOUTH THREE TIMES A  tablet 5   • levothyroxine (SYNTHROID, LEVOTHROID) 50 MCG tablet Take 1 tablet by mouth Daily. 90 tablet 3   • Multiple Vitamin (MULTIVITAMIN) capsule Take 1 capsule by mouth Daily.       No current facility-administered medications for this visit.         FOLLOW-UP:            Return in about 6 months  (around 3/19/2023) for Next scheduled follow up; 1 year wellness .                 Future Appointments   Date Time Provider Department Center   12/19/2022  8:30 AM Hero Balderas MD [facility]   3/20/2023  9:00 AM Azam Colon APRN [facility]   11/21/2023  9:30 AM Azam Colon APRN [facility]           After Visit Summary (AVS) including the Personalized Prevention  Plan Services (PPPS) was either printed and given to the patient at check-out today and/or sent to [facility] for review.       *Examiner was wearing medical surgical mask, face shield and exam gloves during the entire duration of the visit. Patient was masked the entire time.   Minimum social distance of 6 ft maintained entire visit except if physical contact was necessary as documented.      **Dragon Disclaimer:   Much of this encounter note is an electronic transcription/translation of spoken language to printed text. The electronic translation of spoken language may permit erroneous, or at times, nonsensical words or phrases to be inadvertently transcribed. Although I have reviewed the note for such errors, some may still exist.

## 2022-09-20 LAB
25(OH)D3+25(OH)D2 SERPL-MCNC: 56.3 NG/ML (ref 30–100)
ALBUMIN SERPL-MCNC: 4.6 G/DL (ref 3.8–4.8)
ALBUMIN/GLOB SERPL: 2.2 {RATIO} (ref 1.2–2.2)
ALP SERPL-CCNC: 84 IU/L (ref 44–121)
ALT SERPL-CCNC: 18 IU/L (ref 0–32)
APPEARANCE UR: CLEAR
AST SERPL-CCNC: 19 IU/L (ref 0–40)
BACTERIA #/AREA URNS HPF: NORMAL /[HPF]
BASOPHILS # BLD AUTO: 0.1 X10E3/UL (ref 0–0.2)
BASOPHILS NFR BLD AUTO: 1 %
BILIRUB SERPL-MCNC: 0.5 MG/DL (ref 0–1.2)
BILIRUB UR QL STRIP: NEGATIVE
BUN SERPL-MCNC: 27 MG/DL (ref 8–27)
BUN/CREAT SERPL: 28 (ref 12–28)
CALCIUM SERPL-MCNC: 9.7 MG/DL (ref 8.7–10.3)
CASTS URNS QL MICRO: NORMAL /LPF
CHLORIDE SERPL-SCNC: 102 MMOL/L (ref 96–106)
CHOLEST SERPL-MCNC: 222 MG/DL (ref 100–199)
CHOLEST/HDLC SERPL: 2.8 RATIO (ref 0–4.4)
CO2 SERPL-SCNC: 25 MMOL/L (ref 20–29)
COLOR UR: YELLOW
CREAT SERPL-MCNC: 0.98 MG/DL (ref 0.57–1)
EGFRCR SERPLBLD CKD-EPI 2021: 62 ML/MIN/1.73
EOSINOPHIL # BLD AUTO: 0.1 X10E3/UL (ref 0–0.4)
EOSINOPHIL NFR BLD AUTO: 1 %
EPI CELLS #/AREA URNS HPF: NORMAL /HPF (ref 0–10)
ERYTHROCYTE [DISTWIDTH] IN BLOOD BY AUTOMATED COUNT: 12 % (ref 11.7–15.4)
GLOBULIN SER CALC-MCNC: 2.1 G/DL (ref 1.5–4.5)
GLUCOSE SERPL-MCNC: 85 MG/DL (ref 65–99)
GLUCOSE UR QL STRIP: NEGATIVE
HCT VFR BLD AUTO: 36.5 % (ref 34–46.6)
HDLC SERPL-MCNC: 79 MG/DL
HGB BLD-MCNC: 12.2 G/DL (ref 11.1–15.9)
HGB UR QL STRIP: NEGATIVE
IMM GRANULOCYTES # BLD AUTO: 0 X10E3/UL (ref 0–0.1)
IMM GRANULOCYTES NFR BLD AUTO: 0 %
KETONES UR QL STRIP: NEGATIVE
LDLC SERPL CALC-MCNC: 134 MG/DL (ref 0–99)
LEUKOCYTE ESTERASE UR QL STRIP: NEGATIVE
LYMPHOCYTES # BLD AUTO: 1.5 X10E3/UL (ref 0.7–3.1)
LYMPHOCYTES NFR BLD AUTO: 31 %
MCH RBC QN AUTO: 31.3 PG (ref 26.6–33)
MCHC RBC AUTO-ENTMCNC: 33.4 G/DL (ref 31.5–35.7)
MCV RBC AUTO: 94 FL (ref 79–97)
MICRO URNS: NORMAL
MICRO URNS: NORMAL
MONOCYTES # BLD AUTO: 0.6 X10E3/UL (ref 0.1–0.9)
MONOCYTES NFR BLD AUTO: 12 %
NEUTROPHILS # BLD AUTO: 2.5 X10E3/UL (ref 1.4–7)
NEUTROPHILS NFR BLD AUTO: 55 %
NITRITE UR QL STRIP: NEGATIVE
PH UR STRIP: 6.5 [PH] (ref 5–7.5)
PLATELET # BLD AUTO: 169 X10E3/UL (ref 150–450)
POTASSIUM SERPL-SCNC: 4.2 MMOL/L (ref 3.5–5.2)
PROT SERPL-MCNC: 6.7 G/DL (ref 6–8.5)
PROT UR QL STRIP: NEGATIVE
RBC # BLD AUTO: 3.9 X10E6/UL (ref 3.77–5.28)
RBC #/AREA URNS HPF: NORMAL /HPF (ref 0–2)
SODIUM SERPL-SCNC: 141 MMOL/L (ref 134–144)
SP GR UR STRIP: 1.02 (ref 1–1.03)
T4 FREE SERPL-MCNC: 1.09 NG/DL (ref 0.82–1.77)
TRIGL SERPL-MCNC: 52 MG/DL (ref 0–149)
TSH SERPL DL<=0.005 MIU/L-ACNC: 1.87 UIU/ML (ref 0.45–4.5)
URINALYSIS REFLEX: NORMAL
UROBILINOGEN UR STRIP-MCNC: 0.2 MG/DL (ref 0.2–1)
VLDLC SERPL CALC-MCNC: 9 MG/DL (ref 5–40)
WBC # BLD AUTO: 4.6 X10E3/UL (ref 3.4–10.8)
WBC #/AREA URNS HPF: NORMAL /HPF (ref 0–5)

## 2022-09-27 LAB — DRUGS UR: NORMAL

## 2022-12-15 NOTE — PROGRESS NOTES
"Subjective   Patient ID: Tricia Cruz is a 72 y.o. female is here today for one year follow-up. Ms. Cruz was last seen on 12-18-21 with complaints of pain in the morning.    Today, Ms. Cruz reports back pain. She also reports pain in her right heel.     This patient is with her .  She has a known sacral Tarlov cyst that has caused her right buttock and leg pain that is well controlled with gabapentin 600 mg twice daily.  I have discouraged her from having this operated on.  She asked me if I had any objection to her seeing Dr. Jacobs in [place] who has a more aggressive approach that is surgical in nature.  I told her I do not have any objection to her seeing him but I would disagree if he is recommending surgery since she still feels that she is functional.  She has some new intermittent right foot numbness and tingling and even a little bit of pain which could be related to the Tarlov cyst since his large and could affect the contralateral limb.  But she states again that she is functional and does not really have any significant side effects from the gabapentin so my recommendation was to continue doing that and I will see her in 1 year.  If she wants to take an operative approach with his other neurosurgeon, that would be her choice but against my own recommendation.      History of Present Illness    The following portions of the patient's history were reviewed and updated as appropriate: allergies, current medications, past family history, past medical history, past social history, past surgical history and problem list.    Review of Systems   Constitutional: Negative for fever.   Respiratory: Negative for chest tightness and shortness of breath.    Cardiovascular: Negative for chest pain.   Musculoskeletal: Positive for back pain.   All other systems reviewed and are negative.          Objective     Vitals:    12/19/22 0817   BP: 116/70   Weight: 67.1 kg (148 lb)   Height: 170.2 cm (67.01\") "     Body mass index is 23.17 kg/m².    Tobacco Use: Low Risk    • Smoking Tobacco Use: Never   • Smokeless Tobacco Use: Never   • Passive Exposure: Not on file          Physical Exam  Constitutional:       Appearance: She is well-developed.   HENT:      Head: Normocephalic and atraumatic.   Eyes:      Extraocular Movements: EOM normal.      Conjunctiva/sclera: Conjunctivae normal.      Pupils: Pupils are equal, round, and reactive to light.   Neck:      Vascular: No carotid bruit.   Neurological:      Mental Status: She is oriented to person, place, and time.      Coordination: Finger-Nose-Finger Test and Heel to Shin Test normal.      Gait: Gait is intact.      Deep Tendon Reflexes:      Reflex Scores:       Tricep reflexes are 2+ on the right side and 2+ on the left side.       Bicep reflexes are 2+ on the right side and 2+ on the left side.       Brachioradialis reflexes are 2+ on the right side and 2+ on the left side.       Patellar reflexes are 2+ on the right side and 2+ on the left side.       Achilles reflexes are 2+ on the right side and 2+ on the left side.  Psychiatric:         Speech: Speech normal.       Neurologic Exam     Mental Status   Oriented to person, place, and time.   Registration of memory: Good recent and remote memory.   Attention: normal. Concentration: normal.   Speech: speech is normal   Level of consciousness: alert  Knowledge: consistent with education.     Cranial Nerves     CN II   Visual fields full to confrontation.   Visual acuity: normal    CN III, IV, VI   Pupils are equal, round, and reactive to light.  Extraocular motions are normal.     CN V   Facial sensation intact.   Right corneal reflex: normal  Left corneal reflex: normal    CN VII   Facial expression full, symmetric.   Right facial weakness: none  Left facial weakness: none    CN VIII   Hearing: intact    CN IX, X   Palate: symmetric    CN XI   Right sternocleidomastoid strength: normal  Left sternocleidomastoid  strength: normal    CN XII   Tongue: not atrophic  Tongue deviation: none    Motor Exam   Muscle bulk: normal  Right arm tone: normal  Left arm tone: normal  Right leg tone: normal  Left leg tone: normal    Strength   Strength 5/5 except as noted.     Sensory Exam   Light touch normal.     Gait, Coordination, and Reflexes     Gait  Gait: normal    Coordination   Finger to nose coordination: normal  Heel to shin coordination: normal    Reflexes   Right brachioradialis: 2+  Left brachioradialis: 2+  Right biceps: 2+  Left biceps: 2+  Right triceps: 2+  Left triceps: 2+  Right patellar: 2+  Left patellar: 2+  Right achilles: 2+  Left achilles: 2+  Right : 2+  Left : 2+          Assessment & Plan   Independent Review of Radiographic Studies:      I personally reviewed the images from the following studies.    I reviewed the lumbar MRI done in May 2020 which shows a Tarlov cyst in the sacrum is quite prominent and mild disc disease at L3-L4, L4-L5, and L5-S1.  Agree with the report    Medical Decision Making:      Clinically stable, radiographically stable.  I encouraged her to continue managing the Tarlov cyst nonoperatively as we have been doing for the last several years.  She will continue her gabapentin at 600 mg twice daily which I will refill.  I will see her in 1 year.      Diagnoses and all orders for this visit:    1. Tarlov cyst (Primary)    2. DDD (degenerative disc disease), lumbar    3. Low back pain radiating to lower extremity      Return in about 1 year (around 12/19/2023) for Face-to-face.

## 2022-12-19 ENCOUNTER — OFFICE VISIT (OUTPATIENT)
Dept: NEUROSURGERY | Facility: CLINIC | Age: 72
End: 2022-12-19

## 2022-12-19 VITALS
HEIGHT: 67 IN | SYSTOLIC BLOOD PRESSURE: 116 MMHG | DIASTOLIC BLOOD PRESSURE: 70 MMHG | WEIGHT: 148 LBS | BODY MASS INDEX: 23.23 KG/M2

## 2022-12-19 DIAGNOSIS — M54.50 LOW BACK PAIN RADIATING TO LOWER EXTREMITY: ICD-10-CM

## 2022-12-19 DIAGNOSIS — M79.606 LOW BACK PAIN RADIATING TO LOWER EXTREMITY: ICD-10-CM

## 2022-12-19 DIAGNOSIS — M51.36 DDD (DEGENERATIVE DISC DISEASE), LUMBAR: ICD-10-CM

## 2022-12-19 DIAGNOSIS — G96.191 TARLOV CYST: Primary | ICD-10-CM

## 2022-12-19 PROCEDURE — 99213 OFFICE O/P EST LOW 20 MIN: CPT | Performed by: NEUROLOGICAL SURGERY

## 2023-03-21 ENCOUNTER — OFFICE VISIT (OUTPATIENT)
Dept: INTERNAL MEDICINE | Age: 73
End: 2023-03-21
Payer: MEDICARE

## 2023-03-21 VITALS
DIASTOLIC BLOOD PRESSURE: 74 MMHG | OXYGEN SATURATION: 99 % | WEIGHT: 150.6 LBS | BODY MASS INDEX: 23.64 KG/M2 | HEIGHT: 67 IN | SYSTOLIC BLOOD PRESSURE: 122 MMHG | TEMPERATURE: 96.9 F | HEART RATE: 67 BPM

## 2023-03-21 DIAGNOSIS — E03.9 HYPOTHYROIDISM, UNSPECIFIED TYPE: Primary | ICD-10-CM

## 2023-03-21 DIAGNOSIS — E78.5 HYPERLIPIDEMIA, UNSPECIFIED HYPERLIPIDEMIA TYPE: ICD-10-CM

## 2023-03-21 DIAGNOSIS — G96.191 TARLOV CYST: ICD-10-CM

## 2023-03-21 LAB
ALBUMIN SERPL-MCNC: 4.5 G/DL (ref 3.5–5.2)
ALBUMIN/GLOB SERPL: 2 G/DL
ALP SERPL-CCNC: 81 U/L (ref 39–117)
ALT SERPL-CCNC: 18 U/L (ref 1–33)
AST SERPL-CCNC: 23 U/L (ref 1–32)
BILIRUB SERPL-MCNC: 0.5 MG/DL (ref 0–1.2)
BUN SERPL-MCNC: 22 MG/DL (ref 8–23)
BUN/CREAT SERPL: 21 (ref 7–25)
CALCIUM SERPL-MCNC: 10.5 MG/DL (ref 8.6–10.5)
CHLORIDE SERPL-SCNC: 106 MMOL/L (ref 98–107)
CHOLEST SERPL-MCNC: 237 MG/DL (ref 0–200)
CHOLEST/HDLC SERPL: 3 {RATIO}
CO2 SERPL-SCNC: 28.4 MMOL/L (ref 22–29)
CREAT SERPL-MCNC: 1.05 MG/DL (ref 0.57–1)
EGFRCR SERPLBLD CKD-EPI 2021: 56.9 ML/MIN/1.73
GLOBULIN SER CALC-MCNC: 2.3 GM/DL
GLUCOSE SERPL-MCNC: 83 MG/DL (ref 65–99)
HDLC SERPL-MCNC: 79 MG/DL (ref 40–60)
LDLC SERPL CALC-MCNC: 145 MG/DL (ref 0–100)
POTASSIUM SERPL-SCNC: 4.2 MMOL/L (ref 3.5–5.2)
PROT SERPL-MCNC: 6.8 G/DL (ref 6–8.5)
SODIUM SERPL-SCNC: 146 MMOL/L (ref 136–145)
T4 FREE SERPL-MCNC: 1.23 NG/DL (ref 0.93–1.7)
TRIGL SERPL-MCNC: 75 MG/DL (ref 0–150)
TSH SERPL DL<=0.005 MIU/L-ACNC: 1.79 UIU/ML (ref 0.27–4.2)
VLDLC SERPL CALC-MCNC: 13 MG/DL (ref 5–40)

## 2023-03-21 PROCEDURE — 99214 OFFICE O/P EST MOD 30 MIN: CPT | Performed by: NURSE PRACTITIONER

## 2023-03-21 PROCEDURE — 1159F MED LIST DOCD IN RCRD: CPT | Performed by: NURSE PRACTITIONER

## 2023-03-21 PROCEDURE — 1160F RVW MEDS BY RX/DR IN RCRD: CPT | Performed by: NURSE PRACTITIONER

## 2023-03-21 RX ORDER — GABAPENTIN 600 MG/1
600 TABLET ORAL 3 TIMES DAILY
Qty: 270 TABLET | Refills: 5
Start: 2023-03-21

## 2023-03-21 NOTE — PROGRESS NOTES
"    I N T E R N A L  M E D I C I N E  JAY SINGH      ENCOUNTER DATE:  03/21/2023    Tricia Cruz / 73 y.o. / female      CHIEF COMPLAINT / REASON FOR OFFICE VISIT     Hypothyroidism, Back Pain, and Hyperlipidemia      ASSESSMENT & PLAN     Problem List Items Addressed This Visit        Cardiac and Vasculature    Hyperlipidemia    Current Assessment & Plan     Decrease/eliminate soda, and overall caloric intake. Reduce carbohydrates and sweets in diet.  Continue to improve dietary habits with lean proteins, fresh vegetables, fruits, and nuts. Improve aerobic exercise: walking/biking/swimming daily as tolerated, recommend 30 minutes/day at least 5 days/week. May consider CT cardiac calcium score with family history of heart disease. Patient will take information to determine cost.             Endocrine and Metabolic    Hypothyroidism - Primary    Relevant Medications    levothyroxine (SYNTHROID, LEVOTHROID) 50 MCG tablet    Other Relevant Orders    TSH+Free T4    Comprehensive Metabolic Panel    Lipid Panel With / Chol / HDL Ratio       Neuro    Tarlov cyst    Overview     Dr Nelson         Relevant Medications    gabapentin (NEURONTIN) 600 MG tablet     Orders Placed This Encounter   Procedures   • TSH+Free T4   • Comprehensive Metabolic Panel   • Lipid Panel With / Chol / HDL Ratio     New Medications Ordered This Visit   Medications   • gabapentin (NEURONTIN) 600 MG tablet     Sig: Take 1 tablet by mouth 3 (Three) Times a Day.     Dispense:  270 tablet     Refill:  5       SUMMARY/DISCUSSION  Follow-up as scheduled in November, earlier if needed.     Next Appointment with me: Visit date not found    Return for Next scheduled follow up.      VITAL SIGNS     Visit Vitals  /74   Pulse 67   Temp 96.9 °F (36.1 °C) (Temporal)   Ht 170.2 cm (67.01\")   Wt 68.3 kg (150 lb 9.6 oz)   SpO2 99%   BMI 23.58 kg/m²     Wt Readings from Last 3 Encounters:   03/21/23 68.3 kg (150 lb 9.6 oz)   12/19/22 67.1 kg " (148 lb)   09/19/22 67.2 kg (148 lb 3.2 oz)     Body mass index is 23.58 kg/m².      MEDICATIONS AT THE TIME OF OFFICE VISIT     Current Outpatient Medications on File Prior to Visit   Medication Sig   • levothyroxine (SYNTHROID, LEVOTHROID) 50 MCG tablet Take 1 tablet by mouth Daily.   • Multiple Vitamin (MULTIVITAMIN) capsule Take 1 capsule by mouth Daily.   • [DISCONTINUED] gabapentin (NEURONTIN) 600 MG tablet TAKE ONE TABLET BY MOUTH THREE TIMES A DAY     No current facility-administered medications on file prior to visit.          HISTORY OF PRESENT ILLNESS     Hypothyroidism well-controlled with levothyroxine 50 MCG daily, last TSH 1.870.  Denies any significant fatigue, unexplained weight gain, constipation.    Mild hyperlipidemia with last LDL of 134, triglycerides of 52.  Stress test 2021 low risk/normal.      Followed regularly by OB/GYN at [facility].  Scheduled for mammogram through their office.  DEXA scan March 2022 showed osteopenia, no vitamin D supplement at this time. [facility] routine Pap, pelvic exam.     Followed by neurosurgery for Tarlov cyst.  Previous MRI of the pelvis without contrast completed by [facility] imaging on May 11, 2020.  Stable left S2 sacral Tarlov cyst at that time.  Measured 2.1 cm x 2.7 cm x 3.4 cm.  Updated MRI of the pelvis March 2022 showing continued stability of the Tarlov cyst with no significant increase in size. Taking gabapentin 600 mg 3 times daily with moderate relief, prescription through neurosurgery. No new lower extremity weakness, numbness, tingling, saddle paresthesia, or incontinence.       Colon cancer screening cologuard in March 2022 negative.     REVIEW OF SYSTEMS     Constitutional neg except per HPI   Resp neg  CV neg  Musc chronic back pain     PHYSICAL EXAMINATION     Physical Exam  Constitutional  No distress  Cardiovascular Rate  normal . Rhythm: regular . Heart sounds:  normal  Pulmonary/Chest  Effort normal. Breath sounds:   normal  Psychiatric  Alert. Judgment and thought content normal. Mood normal     REVIEWED DATA     Labs:   Lab Results   Component Value Date    GLUCOSE 85 09/19/2022    BUN 27 09/19/2022    CREATININE 0.98 09/19/2022    EGFRRESULT 62 09/19/2022    EGFR >60 03/24/2015    BCR 28 09/19/2022    K 4.2 09/19/2022    CO2 25 09/19/2022    CALCIUM 9.7 09/19/2022    PROTENTOTREF 6.7 09/19/2022    ALBUMIN 4.6 09/19/2022    BILITOT 0.5 09/19/2022    AST 19 09/19/2022    ALT 18 09/19/2022     Lab Results   Component Value Date    WBC 4.6 09/19/2022    HGB 12.2 09/19/2022    HCT 36.5 09/19/2022    MCV 94 09/19/2022     09/19/2022     Lab Results   Component Value Date    TSH 1.870 09/19/2022     Lab Results   Component Value Date    CHLPL 222 (H) 09/19/2022    TRIG 52 09/19/2022    HDL 79 09/19/2022     (H) 09/19/2022     Brief Urine Lab Results  (Last result in the past 365 days)      Color   Clarity   Blood   Leuk Est   Nitrite   Protein   CREAT   Urine HCG        09/19/22 1010 Yellow   Clear   Negative   Negative   Negative   Negative                 Imaging:           Medical Tests:           Summary of old records / correspondence / consultant report:           Request outside records:             *Examiner was wearing medical surgical mask.   **Dragon dictation used for documentation.

## 2023-03-21 NOTE — ASSESSMENT & PLAN NOTE
Decrease/eliminate soda, and overall caloric intake. Reduce carbohydrates and sweets in diet.  Continue to improve dietary habits with lean proteins, fresh vegetables, fruits, and nuts. Improve aerobic exercise: walking/biking/swimming daily as tolerated, recommend 30 minutes/day at least 5 days/week. May consider CT cardiac calcium score with family history of heart disease. Patient will take information to determine cost.

## 2023-09-04 DIAGNOSIS — G96.191 TARLOV CYST: ICD-10-CM

## 2023-09-05 RX ORDER — GABAPENTIN 600 MG/1
TABLET ORAL
Qty: 270 TABLET | Refills: 5 | Status: SHIPPED | OUTPATIENT
Start: 2023-09-05

## 2023-09-05 NOTE — TELEPHONE ENCOUNTER
Rx Refill Note  Requested Prescriptions     Pending Prescriptions Disp Refills    gabapentin (NEURONTIN) 600 MG tablet [Pharmacy Med Name: GABAPENTIN 600 MG TABLET] 270 tablet      Sig: TAKE ONE TABLET BY MOUTH THREE TIMES A DAY      Last office visit with prescribing clinician: 12/19/2022   Last telemedicine visit with prescribing clinician: Visit date not found   Next office visit with prescribing clinician: 12/20/2023                         Would you like a call back once the refill request has been completed: [] Yes [] No    If the office needs to give you a call back, can they leave a voicemail: [] Yes [] No    Nicole Fowler MA  09/05/23, 08:18 EDT

## 2023-11-21 ENCOUNTER — OFFICE VISIT (OUTPATIENT)
Dept: INTERNAL MEDICINE | Age: 73
End: 2023-11-21
Payer: MEDICARE

## 2023-11-21 VITALS
OXYGEN SATURATION: 99 % | DIASTOLIC BLOOD PRESSURE: 76 MMHG | SYSTOLIC BLOOD PRESSURE: 138 MMHG | BODY MASS INDEX: 23.83 KG/M2 | WEIGHT: 151.8 LBS | HEIGHT: 67 IN | TEMPERATURE: 98.2 F | HEART RATE: 75 BPM

## 2023-11-21 DIAGNOSIS — Z51.81 THERAPEUTIC DRUG MONITORING: ICD-10-CM

## 2023-11-21 DIAGNOSIS — G96.191 TARLOV CYST: ICD-10-CM

## 2023-11-21 DIAGNOSIS — Z00.00 MEDICARE ANNUAL WELLNESS VISIT, SUBSEQUENT: Primary | ICD-10-CM

## 2023-11-21 DIAGNOSIS — E03.9 HYPOTHYROIDISM, UNSPECIFIED TYPE: ICD-10-CM

## 2023-11-21 DIAGNOSIS — E78.5 HYPERLIPIDEMIA, UNSPECIFIED HYPERLIPIDEMIA TYPE: ICD-10-CM

## 2023-11-21 PROCEDURE — 1170F FXNL STATUS ASSESSED: CPT | Performed by: NURSE PRACTITIONER

## 2023-11-21 PROCEDURE — G0439 PPPS, SUBSEQ VISIT: HCPCS | Performed by: NURSE PRACTITIONER

## 2023-11-21 NOTE — PROGRESS NOTES
"    I N T E R N A L  M E D I C I N E  JAY SINGH      ENCOUNTER DATE:  11/21/2023    Tricia Cruz / 73 y.o. / female        MEDICARE ANNUAL WELLNESS VISIT       Chief Complaint: Medicare Wellness-subsequent       Patient's general assessment of her health since a year ago:     - Compared to one year ago, she feels her physical health is about the same without significant change.    - Compared to one year ago, she feels her mental health is about the same without significant change.      HPI for other active medical problems:     Hypothyroidism well-controlled with levothyroxine 50 MCG daily, last TSH 1.790.  Denies any significant fatigue, unexplained weight gain, constipation.    Mild hyperlipidemia with last LDL of 145, triglycerides of 72 and HDL 79.  Stress test 2021 low risk/normal.     Followed regularly by OB/GYN at [facility].  Scheduled for mammogram through their office.  DEXA scan March 2022 showed osteopenia, no vitamin D supplement at this time. [facility] performs routine Pap, pelvic exam.     Followed by neurosurgery for Tarlov cyst.  Previous MRI of the pelvis without contrast completed by [facility] imaging on May 11, 2020.  Stable left S2 sacral Tarlov cyst at that time.  Measured 2.1 cm x 2.7 cm x 3.4 cm.  Updated MRI of the pelvis March 2022 showing continued stability of the Tarlov cyst with no significant increase in size. Taking gabapentin 600 mg 3 times daily with moderate relief, prescription through neurosurgery. No new lower extremity weakness, numbness, tingling, saddle paresthesia, or incontinence.       Colon cancer screening cologuard in March 2022 negative.     * The required components of Health Risk Assessment (HRA) that were completed by the patient and/or my staff are contained within this note and in the scanned documents titled \"Health Risk Assessment\" within the media section of the patient's chart in [facility].         HISTORY       Recent Hospitalizations:    Recent " hospitalization?: No     If YES, location, date, and diagnoses:     Location:   Date:   Principle Discharge Dx:   Secondary Dx:       Patient Care Team:    Patient Care Team:  Azam Colon APRN as PCP - General (Internal Medicine)  Mary Fajardo MD as Consulting Physician (Obstetrics and Gynecology)  Hero Balderas MD as Surgeon (Neurosurgery)      Allergies:  Keflex [cephalexin] and Meloxicam    Medications:  Current Outpatient Medications on File Prior to Visit   Medication Sig Dispense Refill    gabapentin (NEURONTIN) 600 MG tablet TAKE ONE TABLET BY MOUTH THREE TIMES A  tablet 5    levothyroxine (SYNTHROID, LEVOTHROID) 50 MCG tablet TAKE ONE TABLET BY MOUTH DAILY 90 tablet 1    Multiple Vitamin (MULTIVITAMIN) capsule Take 1 capsule by mouth Daily.       No current facility-administered medications on file prior to visit.        PFSH:     The following portions of the patient's history were reviewed and updated as appropriate: Allergies / Current Medications / Past Medical History / Surgical History / Social History / Family History    Problem List:  Patient Active Problem List   Diagnosis    Low back pain radiating to lower extremity    Heartburn    Alopecia    Fatigue    Chest pain    Anxiety    Vulvodynia    Tarlov cyst    DDD (degenerative disc disease), lumbar    Hypothyroidism    Gastroesophageal reflux disease without esophagitis    Right groin pain    Persistent headaches    Migraine without aura, not refractory    Hyperlipidemia       Past Medical History:  Past Medical History:   Diagnosis Date    Anxiety     Arthritis     MRI Possibly Indicates    Disease of thyroid gland     Hypothyroidism     Low back pain     Pain in the groin, left 06/01/2018    Pneumonia 2014    Tarlov cyst        Past Surgical History:  History reviewed. No pertinent surgical history.    Social History:  Social History     Socioeconomic History    Marital status:     Number of children: 0    Years of  "education: MA   Tobacco Use    Smoking status: Never    Smokeless tobacco: Never   Vaping Use    Vaping Use: Never used   Substance and Sexual Activity    Alcohol use: No    Drug use: No    Sexual activity: Not Currently     Partners: Male       Family History:  Family History   Problem Relation Age of Onset    Prostate cancer Father     Heart disease Father     Heart attack Father     Cancer Sister         Sarcoma    Hypertension Sister     Thyroid disease Sister     Atrial fibrillation Sister     Lymphoma Brother     No Known Problems Mother     Heart disease Maternal Grandmother     Stroke Maternal Grandmother          PATIENT ASSESSMENT     Vitals:  /76 (BP Location: Left arm, Patient Position: Sitting, Cuff Size: Adult)   Pulse 75   Temp 98.2 °F (36.8 °C) (Temporal)   Ht 170.2 cm (67.01\")   Wt 68.9 kg (151 lb 12.8 oz)   SpO2 99%   BMI 23.77 kg/m²   BP Readings from Last 3 Encounters:   11/21/23 138/76   03/21/23 122/74   12/19/22 116/70     Wt Readings from Last 3 Encounters:   11/21/23 68.9 kg (151 lb 12.8 oz)   03/21/23 68.3 kg (150 lb 9.6 oz)   12/19/22 67.1 kg (148 lb)      Body mass index is 23.77 kg/m².    Pain Score    11/21/23 0934   PainSc: 0-No pain         Review of Systems:    Review of Systems     Constitutional neg except per HPI   Resp neg  CV neg  Musc chronic back pain        Physical Exam:    Physical Exam  Constitutional  No distress  Cardiovascular Rate  normal . Rhythm: regular . Heart sounds:  normal  Pulmonary/Chest  Effort normal. Breath sounds:  normal  Psychiatric  Alert. Judgment and thought content normal. Mood normal      Reviewed Data:    Labs:   Lab Results   Component Value Date     (H) 03/21/2023    K 4.2 03/21/2023    CALCIUM 10.5 03/21/2023    AST 23 03/21/2023    ALT 18 03/21/2023    BUN 22 03/21/2023    CREATININE 1.05 (H) 03/21/2023    CREATININE 0.98 09/19/2022    CREATININE 1.01 (H) 03/07/2022    EGFRIFNONA 62 08/31/2021    EGFRIFAFRI 75 08/31/2021 " "      No results found for: \"GLU\", \"HGBA1C\", \"MICROALBUR\"    Lab Results   Component Value Date     (H) 03/21/2023     (H) 09/19/2022     (H) 03/07/2022    HDL 79 (H) 03/21/2023    TRIG 75 03/21/2023    CHOLHDLRATIO 3.00 03/21/2023       Lab Results   Component Value Date    TSH 1.790 03/21/2023    FREET4 1.23 03/21/2023          Lab Results   Component Value Date    WBC 4.6 09/19/2022    HGB 12.2 09/19/2022    HGB 11.9 03/07/2022    HGB 13.0 02/02/2021     09/19/2022                 No results found for: \"PSA\"    Imaging:          Medical Tests:          Screening for Glaucoma:  Previous screening for glaucoma?: Yes      Hearing Loss Screen:  Finger Rub Hearing Test (right ear): passed  Finger Rub Hearing Test (left ear): passed      Urinary Incontinence Screen:  Episodes of urinary incontinence? : No      Depression Screen:      11/21/2023     9:32 AM   PHQ-2/PHQ-9 Depression Screening   Little Interest or Pleasure in Doing Things 0-->not at all   Feeling Down, Depressed or Hopeless 0-->not at all   PHQ-9: Brief Depression Severity Measure Score 0        PHQ-2: 0 (Not depressed)    PHQ-9: 0 (Negative screening for depression)       FUNCTIONAL, FALL RISK, & COGNITIVE SCREENING (Components below):    DATA:        11/21/2023     9:32 AM   Functional & Cognitive Status   Do you have difficulty preparing food and eating? No   Do you have difficulty bathing yourself, getting dressed or grooming yourself? No   Do you have difficulty using the toilet? No   Do you have difficulty moving around from place to place? No   Do you have trouble with steps or getting out of a bed or a chair? No   Current Diet Well Balanced Diet   Dental Exam Up to date   Eye Exam Not up to date   Exercise (times per week) 3 times per week   Current Exercises Include Walking   Do you need help using the phone?  No   Are you deaf or do you have serious difficulty hearing?  No   Do you need help to go to places out of " walking distance? No   Do you need help shopping? No   Do you need help preparing meals?  No   Do you need help with housework?  No   Do you need help with laundry? No   Do you need help taking your medications? No   Do you need help managing money? No   Do you ever drive or ride in a car without wearing a seat belt? No   Have you felt unusual stress, anger or loneliness in the last month? No   Who do you live with? Spouse   If you need help, do you have trouble finding someone available to you? No   Have you been bothered in the last four weeks by sexual problems? No   Do you have difficulty concentrating, remembering or making decisions? No         A) Assessment of Functional Ability:  (Assessment of ability to perform ADL's (showering/bathing, using toilet, dressing, feeding self, moving self around) and IADL's (use telephone, shop, prepare food, housekeep, do laundry, transport independently, take medications independently, and handle finances)    Degree of functional impairment: NONE (based on assessment noted above)      B) Assessment of Fall Risk:  Fall Risk Assessment was completed, and patient is at low risk for falls.       Need for further evaluation of gait, strength, and balance? : No     Timed Up and Go (TUG):   (>= 12 seconds indicates high risk for falling)    Observable abnormalities included: Normal gait pattern       C. Assessment of Cognitive Function:    Mini-Cog Test:     1) Registration (3 objects): Yes   2) Number of objects recalled: 2   3) Clock Draw: Passed? : Yes       Further evaluation required? : No        COUNSELING       A. Identification of Health Risk Factors:    Risk factors include: cardiovascular risk factors      B. Age-Appropriate Screening Schedule:  (Refer to the list below for future screening recommendations based on patient's age, sex and/or medical conditions. Orders for these recommended tests are listed in the plan section. The patient has been provided with a written  plan)    Health Maintenance Topics  Health Maintenance   Topic Date Due    TDAP/TD VACCINES (1 - Tdap) Never done    ZOSTER VACCINE (1 of 2) Never done    INFLUENZA VACCINE  08/01/2023    COVID-19 Vaccine (3 - 2023-24 season) 09/01/2023    Pneumococcal Vaccine 65+ (1 - PCV) 03/21/2024 (Originally 3/10/2017)    MAMMOGRAM  03/21/2024 (Originally 3/3/2022)    DXA SCAN  03/08/2024    LIPID PANEL  03/21/2024    ANNUAL WELLNESS VISIT  11/21/2024    COLORECTAL CANCER SCREENING  01/09/2030    HEPATITIS C SCREENING  Completed       Health Maintenance Topics Due or Over-Due  Health Maintenance Due   Topic Date Due    TDAP/TD VACCINES (1 - Tdap) Never done    ZOSTER VACCINE (1 of 2) Never done    INFLUENZA VACCINE  08/01/2023    COVID-19 Vaccine (3 - 2023-24 season) 09/01/2023         C. Advanced Care Planning:    Advance Care Planning   ACP discussion was held with the patient during this visit. Patient has an advance directive (not in EMR), copy requested.       D. Patient Self-Management and Personalized Health Advice:    She has been provided with personalized counseling/information (including brochures/handouts) about:     -- optimizing diet/nutrition plans      She has been recommended for the following preventative services which has been performed today, will be ordered today or ordered/performed on upcoming follow-up visit:     -- Nutrition/exercise    E. Miscellaneous Items:    -Aspirin use counseling: Does not need ASA (and currently is not on it)    -Discussed BMI with her. The BMI is in the acceptable range    -Reviewed use of high risk medication in the elderly: YES    -Reviewed for potential of harmful drug interactions in the elderly: YES        WRAP UP       Assessment & Plan:  1) MEDICARE ANNUAL WELLNESS VISIT    2) OTHER MEDICAL CONDITIONS ADDRESSED TODAY:            Problem List Items Addressed This Visit          Cardiac and Vasculature    Hyperlipidemia    Relevant Orders    Comprehensive Metabolic Panel     Lipid Panel With / Chol / HDL Ratio    CBC w AUTO Differential    Urinalysis With Culture If Indicated - Urine, Clean Catch       Endocrine and Metabolic    Hypothyroidism    Relevant Medications    levothyroxine (SYNTHROID, LEVOTHROID) 50 MCG tablet    Other Relevant Orders    TSH+Free T4    CBC w AUTO Differential    Urinalysis With Culture If Indicated - Urine, Clean Catch       Neuro    Tarlov cyst    Overview     Dr Nelson         Relevant Medications    gabapentin (NEURONTIN) 600 MG tablet    Other Relevant Orders    Compliance Drug Analysis, Ur - Urine, Clean Catch     Other Visit Diagnoses       Medicare annual wellness visit, subsequent    -  Primary    Therapeutic drug monitoring        Relevant Orders    Compliance Drug Analysis, Ur - Urine, Clean Catch                      Orders Placed This Encounter   Procedures    Comprehensive Metabolic Panel    Lipid Panel With / Chol / HDL Ratio    TSH+Free T4    Urinalysis With Culture If Indicated - Urine, Clean Catch    Compliance Drug Analysis, Ur - Urine, Clean Catch    CBC w AUTO Differential       Discussion / Summary:  Has been reviewed and controlled substance contract signed for gabapentin.  Follow-up in 6 months for chronic medical, 1 year Medicare wellness  Continue specialty management with OB/GYN      Medications as of TODAY:              Current Outpatient Medications   Medication Sig Dispense Refill    gabapentin (NEURONTIN) 600 MG tablet TAKE ONE TABLET BY MOUTH THREE TIMES A  tablet 5    levothyroxine (SYNTHROID, LEVOTHROID) 50 MCG tablet TAKE ONE TABLET BY MOUTH DAILY 90 tablet 1    Multiple Vitamin (MULTIVITAMIN) capsule Take 1 capsule by mouth Daily.       No current facility-administered medications for this visit.         FOLLOW-UP:            No follow-ups on file.                 Future Appointments   Date Time Provider Department Center   12/20/2023 10:00 AM Hero Balderas MD [facility]           After Visit Summary  (AVS) including the Personalized Prevention  Plan Services (PPPS) was either printed and given to the patient at check-out today and/or [facility].       *Dragon dictation used for documentation.

## 2023-11-22 LAB
ALBUMIN SERPL-MCNC: 4.4 G/DL (ref 3.8–4.8)
ALBUMIN/GLOB SERPL: 2.1 {RATIO} (ref 1.2–2.2)
ALP SERPL-CCNC: 83 IU/L (ref 44–121)
ALT SERPL-CCNC: 17 IU/L (ref 0–32)
APPEARANCE UR: CLEAR
AST SERPL-CCNC: 19 IU/L (ref 0–40)
BACTERIA #/AREA URNS HPF: NORMAL /[HPF]
BASOPHILS # BLD AUTO: 0 X10E3/UL (ref 0–0.2)
BASOPHILS NFR BLD AUTO: 1 %
BILIRUB SERPL-MCNC: 0.3 MG/DL (ref 0–1.2)
BILIRUB UR QL STRIP: NEGATIVE
BUN SERPL-MCNC: 24 MG/DL (ref 8–27)
BUN/CREAT SERPL: 25 (ref 12–28)
CALCIUM SERPL-MCNC: 9.4 MG/DL (ref 8.7–10.3)
CASTS URNS QL MICRO: NORMAL /LPF
CHLORIDE SERPL-SCNC: 104 MMOL/L (ref 96–106)
CHOLEST SERPL-MCNC: 223 MG/DL (ref 100–199)
CHOLEST/HDLC SERPL: 3.2 RATIO (ref 0–4.4)
CO2 SERPL-SCNC: 27 MMOL/L (ref 20–29)
COLOR UR: YELLOW
CREAT SERPL-MCNC: 0.95 MG/DL (ref 0.57–1)
EGFRCR SERPLBLD CKD-EPI 2021: 64 ML/MIN/1.73
EOSINOPHIL # BLD AUTO: 0.1 X10E3/UL (ref 0–0.4)
EOSINOPHIL NFR BLD AUTO: 1 %
EPI CELLS #/AREA URNS HPF: NORMAL /HPF (ref 0–10)
ERYTHROCYTE [DISTWIDTH] IN BLOOD BY AUTOMATED COUNT: 11.6 % (ref 11.7–15.4)
GLOBULIN SER CALC-MCNC: 2.1 G/DL (ref 1.5–4.5)
GLUCOSE SERPL-MCNC: 89 MG/DL (ref 70–99)
GLUCOSE UR QL STRIP: NEGATIVE
HCT VFR BLD AUTO: 35.7 % (ref 34–46.6)
HDLC SERPL-MCNC: 69 MG/DL
HGB BLD-MCNC: 11.9 G/DL (ref 11.1–15.9)
HGB UR QL STRIP: NEGATIVE
IMM GRANULOCYTES # BLD AUTO: 0 X10E3/UL (ref 0–0.1)
IMM GRANULOCYTES NFR BLD AUTO: 0 %
KETONES UR QL STRIP: NEGATIVE
LDLC SERPL CALC-MCNC: 135 MG/DL (ref 0–99)
LEUKOCYTE ESTERASE UR QL STRIP: NEGATIVE
LYMPHOCYTES # BLD AUTO: 1.1 X10E3/UL (ref 0.7–3.1)
LYMPHOCYTES NFR BLD AUTO: 26 %
MCH RBC QN AUTO: 31.6 PG (ref 26.6–33)
MCHC RBC AUTO-ENTMCNC: 33.3 G/DL (ref 31.5–35.7)
MCV RBC AUTO: 95 FL (ref 79–97)
MICRO URNS: NORMAL
MICRO URNS: NORMAL
MONOCYTES # BLD AUTO: 0.5 X10E3/UL (ref 0.1–0.9)
MONOCYTES NFR BLD AUTO: 12 %
NEUTROPHILS # BLD AUTO: 2.6 X10E3/UL (ref 1.4–7)
NEUTROPHILS NFR BLD AUTO: 60 %
NITRITE UR QL STRIP: NEGATIVE
PH UR STRIP: 6.5 [PH] (ref 5–7.5)
PLATELET # BLD AUTO: 164 X10E3/UL (ref 150–450)
POTASSIUM SERPL-SCNC: 4 MMOL/L (ref 3.5–5.2)
PROT SERPL-MCNC: 6.5 G/DL (ref 6–8.5)
PROT UR QL STRIP: NEGATIVE
RBC # BLD AUTO: 3.77 X10E6/UL (ref 3.77–5.28)
RBC #/AREA URNS HPF: NORMAL /HPF (ref 0–2)
SODIUM SERPL-SCNC: 143 MMOL/L (ref 134–144)
SP GR UR STRIP: 1.02 (ref 1–1.03)
T4 FREE SERPL-MCNC: 1.04 NG/DL (ref 0.82–1.77)
TRIGL SERPL-MCNC: 111 MG/DL (ref 0–149)
TSH SERPL DL<=0.005 MIU/L-ACNC: 1.91 UIU/ML (ref 0.45–4.5)
URINALYSIS REFLEX: NORMAL
UROBILINOGEN UR STRIP-MCNC: 0.2 MG/DL (ref 0.2–1)
VLDLC SERPL CALC-MCNC: 19 MG/DL (ref 5–40)
WBC # BLD AUTO: 4.3 X10E3/UL (ref 3.4–10.8)
WBC #/AREA URNS HPF: NORMAL /HPF (ref 0–5)

## 2023-11-28 LAB — DRUGS UR: NORMAL

## 2023-12-15 NOTE — PROGRESS NOTES
"Subjective   Patient ID: Tricia Cruz is a 73 y.o. female is here today for follow-up.    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  I have specifically recommended nonoperative approach to the management of her cyst.  I think operating on the cyst is not likely to help her symptoms and could very well because of very difficult cerebrospinal fluid leak.  She takes the gabapentin now at 600 mg 3 times daily which seems to help her symptoms.  She states emphatically that she still feels functional.  She has no motor deficits.  I think it is better just to control her symptoms unless things worsen.  I did tell her that there is a very small but nonzero chance that the cyst could leak and she could get continuous intracranial hypotension.  That would be a little bit different and probably justify trying to close the leak but this is exceedingly rare syndrome.  In terms of her neuropathic pain though I think that continuation the gabapentin is the best option and I will follow her and see her in 1 year.        History of Present Illness    The following portions of the patient's history were reviewed and updated as appropriate: allergies, current medications, past family history, past medical history, past social history, past surgical history, and problem list.    Review of Systems   Constitutional:  Negative for fever.   Musculoskeletal:  Positive for back pain.   Neurological:  Positive for numbness. Negative for weakness.   All other systems reviewed and are negative.          Objective     Vitals:    12/20/23 1004   BP: 144/76   BP Location: Left arm   Patient Position: Sitting   Cuff Size: Adult   Resp: 20   Weight: 68.5 kg (151 lb)   Height: 170.2 cm (67.01\")   PainSc:   2     Body mass index is 23.64 kg/m².    Tobacco Use: Low Risk  (12/20/2023)    Patient History     Smoking " Tobacco Use: Never     Smokeless Tobacco Use: Never     Passive Exposure: Not on file          Physical Exam  Constitutional:       Appearance: She is well-developed.   HENT:      Head: Normocephalic and atraumatic.   Eyes:      Extraocular Movements: EOM normal.      Conjunctiva/sclera: Conjunctivae normal.      Pupils: Pupils are equal, round, and reactive to light.   Neck:      Vascular: No carotid bruit.   Neurological:      Mental Status: She is oriented to person, place, and time.      Coordination: Finger-Nose-Finger Test and Heel to Shin Test normal.      Gait: Gait is intact.      Deep Tendon Reflexes:      Reflex Scores:       Tricep reflexes are 2+ on the right side and 2+ on the left side.       Bicep reflexes are 2+ on the right side and 2+ on the left side.       Brachioradialis reflexes are 2+ on the right side and 2+ on the left side.       Patellar reflexes are 2+ on the right side and 2+ on the left side.       Achilles reflexes are 2+ on the right side and 2+ on the left side.  Psychiatric:         Speech: Speech normal.       Neurologic Exam     Mental Status   Oriented to person, place, and time.   Registration of memory: Good recent and remote memory.   Attention: normal. Concentration: normal.   Speech: speech is normal   Level of consciousness: alert  Knowledge: consistent with education.     Cranial Nerves     CN II   Visual fields full to confrontation.   Visual acuity: normal    CN III, IV, VI   Pupils are equal, round, and reactive to light.  Extraocular motions are normal.     CN V   Facial sensation intact.   Right corneal reflex: normal  Left corneal reflex: normal    CN VII   Facial expression full, symmetric.   Right facial weakness: none  Left facial weakness: none    CN VIII   Hearing: intact    CN IX, X   Palate: symmetric    CN XI   Right sternocleidomastoid strength: normal  Left sternocleidomastoid strength: normal    CN XII   Tongue: not atrophic  Tongue deviation:  none    Motor Exam   Muscle bulk: normal  Right arm tone: normal  Left arm tone: normal  Right leg tone: normal  Left leg tone: normal    Strength   Strength 5/5 except as noted.     Sensory Exam   Light touch normal.     Gait, Coordination, and Reflexes     Gait  Gait: normal    Coordination   Finger to nose coordination: normal  Heel to shin coordination: normal    Reflexes   Right brachioradialis: 2+  Left brachioradialis: 2+  Right biceps: 2+  Left biceps: 2+  Right triceps: 2+  Left triceps: 2+  Right patellar: 2+  Left patellar: 2+  Right achilles: 2+  Left achilles: 2+  Right : 2+  Left : 2+          Assessment & Plan   Independent Review of Radiographic Studies:      I personally reviewed the images from the following studies.    I reviewed the lumbar MRI done in May 2020 which shows a Tarlov cyst in the sacrum is quite prominent and mild disc disease at L3-L4, L4-L5, and L5-S1.  Agree with the report     Medical Decision Making:      Continue medical management and she will take her gabapentin at 600 mg 3 times daily which I will refill electronically.  Will see her in 1 year.  If there is a change of symptoms potentially involving positional headaches or worsening of her pain, she will let me know.        Diagnoses and all orders for this visit:    1. Tarlov cyst (Primary)    2. DDD (degenerative disc disease), lumbar    3. Low back pain radiating to lower extremity    4. Neuropathic pain, leg, right      Return in about 1 year (around 12/20/2024) for face to face.

## 2023-12-20 ENCOUNTER — OFFICE VISIT (OUTPATIENT)
Dept: NEUROSURGERY | Facility: CLINIC | Age: 73
End: 2023-12-20
Payer: MEDICARE

## 2023-12-20 VITALS
SYSTOLIC BLOOD PRESSURE: 144 MMHG | WEIGHT: 151 LBS | BODY MASS INDEX: 23.7 KG/M2 | HEIGHT: 67 IN | RESPIRATION RATE: 20 BRPM | DIASTOLIC BLOOD PRESSURE: 76 MMHG

## 2023-12-20 DIAGNOSIS — M54.50 LOW BACK PAIN RADIATING TO LOWER EXTREMITY: ICD-10-CM

## 2023-12-20 DIAGNOSIS — M79.2 NEUROPATHIC PAIN, LEG, RIGHT: ICD-10-CM

## 2023-12-20 DIAGNOSIS — G96.191 TARLOV CYST: Primary | ICD-10-CM

## 2023-12-20 DIAGNOSIS — M79.606 LOW BACK PAIN RADIATING TO LOWER EXTREMITY: ICD-10-CM

## 2023-12-20 DIAGNOSIS — M51.36 DDD (DEGENERATIVE DISC DISEASE), LUMBAR: ICD-10-CM

## 2023-12-20 PROCEDURE — 1160F RVW MEDS BY RX/DR IN RCRD: CPT | Performed by: NEUROLOGICAL SURGERY

## 2023-12-20 PROCEDURE — 1159F MED LIST DOCD IN RCRD: CPT | Performed by: NEUROLOGICAL SURGERY

## 2023-12-20 PROCEDURE — 99213 OFFICE O/P EST LOW 20 MIN: CPT | Performed by: NEUROLOGICAL SURGERY

## 2024-01-02 DIAGNOSIS — E03.9 ACQUIRED HYPOTHYROIDISM: ICD-10-CM

## 2024-01-02 RX ORDER — LEVOTHYROXINE SODIUM 0.05 MG/1
50 TABLET ORAL DAILY
Qty: 90 TABLET | Refills: 1 | Status: SHIPPED | OUTPATIENT
Start: 2024-01-02

## 2024-05-21 ENCOUNTER — OFFICE VISIT (OUTPATIENT)
Dept: INTERNAL MEDICINE | Age: 74
End: 2024-05-21
Payer: MEDICARE

## 2024-05-21 VITALS
OXYGEN SATURATION: 99 % | BODY MASS INDEX: 23.54 KG/M2 | HEART RATE: 67 BPM | WEIGHT: 150 LBS | TEMPERATURE: 98.5 F | HEIGHT: 67 IN | SYSTOLIC BLOOD PRESSURE: 138 MMHG | DIASTOLIC BLOOD PRESSURE: 86 MMHG

## 2024-05-21 DIAGNOSIS — E55.9 VITAMIN D DEFICIENCY: ICD-10-CM

## 2024-05-21 DIAGNOSIS — E03.9 HYPOTHYROIDISM, UNSPECIFIED TYPE: ICD-10-CM

## 2024-05-21 DIAGNOSIS — G96.191 TARLOV CYST: ICD-10-CM

## 2024-05-21 DIAGNOSIS — E78.5 HYPERLIPIDEMIA, UNSPECIFIED HYPERLIPIDEMIA TYPE: Primary | ICD-10-CM

## 2024-05-21 DIAGNOSIS — Z79.899 HIGH RISK MEDICATION USE: ICD-10-CM

## 2024-05-21 PROCEDURE — 99214 OFFICE O/P EST MOD 30 MIN: CPT | Performed by: NURSE PRACTITIONER

## 2024-05-21 PROCEDURE — G2211 COMPLEX E/M VISIT ADD ON: HCPCS | Performed by: NURSE PRACTITIONER

## 2024-05-21 PROCEDURE — 1126F AMNT PAIN NOTED NONE PRSNT: CPT | Performed by: NURSE PRACTITIONER

## 2024-05-22 LAB
25(OH)D3+25(OH)D2 SERPL-MCNC: 53.4 NG/ML (ref 30–100)
ALBUMIN SERPL-MCNC: 4.4 G/DL (ref 3.8–4.8)
ALBUMIN/GLOB SERPL: 2 {RATIO} (ref 1.2–2.2)
ALP SERPL-CCNC: 76 IU/L (ref 44–121)
ALT SERPL-CCNC: 16 IU/L (ref 0–32)
AST SERPL-CCNC: 21 IU/L (ref 0–40)
BILIRUB SERPL-MCNC: 0.6 MG/DL (ref 0–1.2)
BUN SERPL-MCNC: 24 MG/DL (ref 8–27)
BUN/CREAT SERPL: 21 (ref 12–28)
CALCIUM SERPL-MCNC: 10 MG/DL (ref 8.7–10.3)
CHLORIDE SERPL-SCNC: 102 MMOL/L (ref 96–106)
CHOLEST SERPL-MCNC: 216 MG/DL (ref 100–199)
CHOLEST/HDLC SERPL: 2.9 RATIO (ref 0–4.4)
CO2 SERPL-SCNC: 25 MMOL/L (ref 20–29)
CREAT SERPL-MCNC: 1.14 MG/DL (ref 0.57–1)
EGFRCR SERPLBLD CKD-EPI 2021: 51 ML/MIN/1.73
GLOBULIN SER CALC-MCNC: 2.2 G/DL (ref 1.5–4.5)
GLUCOSE SERPL-MCNC: 85 MG/DL (ref 70–99)
HDLC SERPL-MCNC: 75 MG/DL
LDLC SERPL CALC-MCNC: 130 MG/DL (ref 0–99)
POTASSIUM SERPL-SCNC: 4.2 MMOL/L (ref 3.5–5.2)
PROT SERPL-MCNC: 6.6 G/DL (ref 6–8.5)
SODIUM SERPL-SCNC: 141 MMOL/L (ref 134–144)
T4 FREE SERPL-MCNC: 1.15 NG/DL (ref 0.82–1.77)
TRIGL SERPL-MCNC: 60 MG/DL (ref 0–149)
TSH SERPL DL<=0.005 MIU/L-ACNC: 0.97 UIU/ML (ref 0.45–4.5)
VLDLC SERPL CALC-MCNC: 11 MG/DL (ref 5–40)

## 2024-06-01 LAB — DRUGS UR: NORMAL

## 2024-06-28 DIAGNOSIS — E03.9 ACQUIRED HYPOTHYROIDISM: ICD-10-CM

## 2024-06-30 RX ORDER — LEVOTHYROXINE SODIUM 0.05 MG/1
50 TABLET ORAL DAILY
Qty: 90 TABLET | Refills: 1 | Status: SHIPPED | OUTPATIENT
Start: 2024-06-30

## 2024-09-23 ENCOUNTER — HOSPITAL ENCOUNTER (OUTPATIENT)
Facility: HOSPITAL | Age: 74
Discharge: HOME | End: 2024-09-23
Admitting: NURSE PRACTITIONER
Payer: MEDICARE

## 2024-09-23 ENCOUNTER — OFFICE VISIT (OUTPATIENT)
Dept: INTERNAL MEDICINE | Age: 74
End: 2024-09-23
Payer: MEDICARE

## 2024-09-23 VITALS
HEIGHT: 67 IN | OXYGEN SATURATION: 98 % | SYSTOLIC BLOOD PRESSURE: 118 MMHG | DIASTOLIC BLOOD PRESSURE: 72 MMHG | BODY MASS INDEX: 23.92 KG/M2 | TEMPERATURE: 98.1 F | WEIGHT: 152.4 LBS | HEART RATE: 58 BPM

## 2024-09-23 DIAGNOSIS — F41.1 GENERALIZED ANXIETY DISORDER: ICD-10-CM

## 2024-09-23 DIAGNOSIS — R07.89 ATYPICAL CHEST PAIN: Primary | ICD-10-CM

## 2024-09-23 PROCEDURE — 71046 X-RAY EXAM CHEST 2 VIEWS: CPT

## 2024-09-23 PROCEDURE — 99213 OFFICE O/P EST LOW 20 MIN: CPT | Performed by: NURSE PRACTITIONER

## 2024-09-23 PROCEDURE — 1125F AMNT PAIN NOTED PAIN PRSNT: CPT | Performed by: NURSE PRACTITIONER

## 2024-09-23 PROCEDURE — 93000 ELECTROCARDIOGRAM COMPLETE: CPT | Performed by: NURSE PRACTITIONER

## 2024-09-23 RX ORDER — BUSPIRONE HYDROCHLORIDE 5 MG/1
5 TABLET ORAL 3 TIMES DAILY PRN
Qty: 30 TABLET | Refills: 0 | Status: SHIPPED | OUTPATIENT
Start: 2024-09-23

## 2024-09-30 DIAGNOSIS — F41.1 GENERALIZED ANXIETY DISORDER: ICD-10-CM

## 2024-10-01 RX ORDER — BUSPIRONE HYDROCHLORIDE 5 MG/1
5 TABLET ORAL 3 TIMES DAILY PRN
Qty: 30 TABLET | Refills: 0 | OUTPATIENT
Start: 2024-10-01

## 2024-11-11 DIAGNOSIS — G96.191 TARLOV CYST: ICD-10-CM

## 2024-11-11 RX ORDER — GABAPENTIN 600 MG/1
TABLET ORAL
Qty: 270 TABLET | Refills: 5 | Status: SHIPPED | OUTPATIENT
Start: 2024-11-11

## 2024-11-11 NOTE — TELEPHONE ENCOUNTER
Rx Refill Note  Requested Prescriptions     Pending Prescriptions Disp Refills    gabapentin (NEURONTIN) 600 MG tablet [Pharmacy Med Name: GABAPENTIN 600 MG TABLET] 270 tablet      Sig: TAKE ONE TABLET BY MOUTH THREE TIMES A DAY      Last office visit with prescribing clinician: 12/20/2023   Last telemedicine visit with prescribing clinician: Visit date not found   Next office visit with prescribing clinician: 12/18/2024                         Would you like a call back once the refill request has been completed: [] Yes [] No    If the office needs to give you a call back, can they leave a voicemail: [] Yes [] No    Nicole Fowler MA  11/11/24, 09:08 EST

## 2024-11-27 ENCOUNTER — OFFICE VISIT (OUTPATIENT)
Dept: INTERNAL MEDICINE | Age: 74
End: 2024-11-27
Payer: MEDICARE

## 2024-11-27 VITALS
WEIGHT: 152.6 LBS | DIASTOLIC BLOOD PRESSURE: 80 MMHG | HEIGHT: 67 IN | HEART RATE: 58 BPM | TEMPERATURE: 97.9 F | BODY MASS INDEX: 23.95 KG/M2 | OXYGEN SATURATION: 98 % | SYSTOLIC BLOOD PRESSURE: 148 MMHG

## 2024-11-27 DIAGNOSIS — Z79.899 HIGH RISK MEDICATION USE: ICD-10-CM

## 2024-11-27 DIAGNOSIS — F41.9 ANXIETY: ICD-10-CM

## 2024-11-27 DIAGNOSIS — E78.5 HYPERLIPIDEMIA, UNSPECIFIED HYPERLIPIDEMIA TYPE: Primary | ICD-10-CM

## 2024-11-27 DIAGNOSIS — G96.191 TARLOV CYST: ICD-10-CM

## 2024-11-27 DIAGNOSIS — E03.9 HYPOTHYROIDISM, UNSPECIFIED TYPE: ICD-10-CM

## 2024-11-27 PROBLEM — G43.709 NON-REFRACTORY CHRONIC MIGRAINE WITHOUT AURA: Status: ACTIVE | Noted: 2022-03-07

## 2024-11-27 PROCEDURE — G0439 PPPS, SUBSEQ VISIT: HCPCS | Performed by: NURSE PRACTITIONER

## 2024-11-27 PROCEDURE — 1126F AMNT PAIN NOTED NONE PRSNT: CPT | Performed by: NURSE PRACTITIONER

## 2024-11-27 NOTE — PROGRESS NOTES
S K Y L E R    F R Y E ,   D N P ,  A P R N                  I  N  T  E  R  N  A  L    M  E  D  I  C  I  N  E         ENCOUNTER DATE:  11/27/2024    Tricia Cruz / 74 y.o. / female    MEDICARE ANNUAL WELLNESS VISIT       CHIEF COMPLAINT / REASON FOR OFFICE VISIT     Hyperlipidemia, Hypothyroidism, Back Pain, and Medicare Wellness-subsequent        Patient's general assessment of her health since a year ago:     - Compared to one year ago, she feels her physical health is:   STABLE/SAME    - Compared to one year ago, she feels her mental health is:  STABLE/SAME    Recent Hospitalization (within past 365 days) (NO unless indicated)  No    Patient Care Team:    Patient Care Team:  Azam Colon, MIROSLAVA, APRN as PCP - General (Internal Medicine)  Hero Balderas MD as Surgeon (Neurosurgery)    Allergies:  Keflex [cephalexin] and Meloxicam    Medications:  Current Outpatient Medications on File Prior to Visit   Medication Sig Dispense Refill    busPIRone (BUSPAR) 5 MG tablet Take 1 tablet by mouth 3 (Three) Times a Day As Needed (anxiety). 30 tablet 0    gabapentin (NEURONTIN) 600 MG tablet TAKE ONE TABLET BY MOUTH THREE TIMES A DAY (Patient taking differently: Take 1 tablet by mouth 2 (Two) Times a Day.) 270 tablet 5    levothyroxine (SYNTHROID, LEVOTHROID) 50 MCG tablet TAKE 1 TABLET BY MOUTH DAILY 90 tablet 1    Multiple Vitamin (MULTIVITAMIN) capsule Take 1 capsule by mouth Daily.       No current facility-administered medications on file prior to visit.        No opioid medication identified on active medication list. I have reviewed chart for other potential  high risk medication/s and harmful drug interactions in the elderly.      No opioid listed on medication list       HPI FOR OTHER ACTIVE MEDICAL PROBLEMS:    Hypothyroidism well-controlled with levothyroxine 50 MCG daily, last TSH 0.971.  Denies any significant fatigue, unexplained weight gain, constipation.    Mild hyperlipidemia with  last LDL of 130, triglycerides of 60 and HDL 75.  Stress test 2021 low risk/normal.        Followed regularly by [facility]N at [facility].  Scheduled for mammogram through their office.  DEXA scan March 2022 showed osteopenia, no vitamin D supplement at this time. [facility] routine Pap, pelvic exam.  Scheduling early next year.     Followed by neurosurgery for Tarlov cyst.  Previous MRI of the pelvis without contrast completed by [facility] imaging on May 11, 2020.  Stable left S2 sacral Tarlov cyst at that time.  Measured 2.1 cm x 2.7 cm x 3.4 cm.  Updated MRI of the pelvis March 2022 showing continued stability of the Tarlov cyst with no significant increase in size. Taking gabapentin 600 mg 3 times daily with moderate relief, prescription through neurosurgery. No new lower extremity weakness, numbness, tingling, saddle paresthesia, or incontinence.   Continues gabapentin for pain control.  Moderately helping.     Colon cancer screening cologuard in March 2022 negative.      HISTORY     PFSH:     The following portions of the patient's history were reviewed and updated as appropriate: Allergies / Current Medications / Past Medical History / Surgical History / Social History / Family History    Problem List:  Patient Active Problem List   Diagnosis    Low back pain radiating to lower extremity    Heartburn    Alopecia    Fatigue    Chest pain    Anxiety    Vulvodynia    Tarlov cyst    DDD (degenerative disc disease), lumbar    Hypothyroidism    Gastroesophageal reflux disease without esophagitis    Right groin pain    Persistent headaches    Non-refractory chronic migraine without aura    Hyperlipidemia    Neuropathic pain, leg, right       Past Medical History:  Past Medical History:   Diagnosis Date    Anxiety     Arthritis     MRI Possibly Indicates    Disease of thyroid gland     Hypothyroidism     Low back pain     Pain in the groin, left 06/01/2018    Pneumonia 2014    Tarlov cyst        Past  "Surgical History:  History reviewed. No pertinent surgical history.    Social History:  Social History     Socioeconomic History    Marital status:     Number of children: 0    Years of education: MA   Tobacco Use    Smoking status: Never    Smokeless tobacco: Never   Vaping Use    Vaping status: Never Used   Substance and Sexual Activity    Alcohol use: No    Drug use: No    Sexual activity: Not Currently     Partners: Male       Family History:  Family History   Problem Relation Age of Onset    Prostate cancer Father     Heart disease Father     Heart attack Father     Cancer Sister         Sarcoma    Hypertension Sister     Thyroid disease Sister     Atrial fibrillation Sister     Lymphoma Brother     No Known Problems Mother     Heart disease Maternal Grandmother     Stroke Maternal Grandmother          PATIENT ASSESSMENT     Vitals:  Vitals:    11/27/24 1030   BP: 148/80   Pulse: 58   Temp: 97.9 °F (36.6 °C)   SpO2: 98%   Weight: 69.2 kg (152 lb 9.6 oz)   Height: 170.2 cm (67.01\")       BP Readings from Last 3 Encounters:   11/27/24 148/80   09/23/24 118/72   05/21/24 138/86     Wt Readings from Last 3 Encounters:   11/27/24 69.2 kg (152 lb 9.6 oz)   09/23/24 69.1 kg (152 lb 6.4 oz)   05/21/24 68 kg (150 lb)      Body mass index is 23.89 kg/m².     Review of Systems:     Constitutional neg except per HPI   Resp neg  CV neg   Musc Chronic back pain      Physical Exam:    Physical Exam  Constitutional  No distress  Cardiovascular Rate  normal . Rhythm: regular . Heart sounds:  normal  Pulmonary/Chest  Effort normal. Breath sounds:  normal  Psychiatric  Alert. Judgment and thought content normal. Mood normal      Reviewed Data:    Labs:   Lab Results   Component Value Date     05/21/2024    K 4.2 05/21/2024    CALCIUM 10.0 05/21/2024    AST 21 05/21/2024    ALT 16 05/21/2024    BUN 24 05/21/2024    CREATININE 1.14 (H) 05/21/2024    CREATININE 0.95 11/21/2023    CREATININE 1.05 (H) 03/21/2023    " "EGFRIFNONA 62 08/31/2021    EGFRIFAFRI 75 08/31/2021   No results found for: \"GLU\", \"HGBA1C\", \"MICROALBUR\"  Lab Results   Component Value Date     (H) 05/21/2024     (H) 11/21/2023     (H) 03/21/2023    HDL 75 05/21/2024    TRIG 60 05/21/2024    CHOLHDLRATIO 2.9 05/21/2024     Lab Results   Component Value Date    TSH 0.971 05/21/2024    FREET4 1.15 05/21/2024     Lab Results   Component Value Date    WBC 4.3 11/21/2023    HGB 11.9 11/21/2023    HGB 12.2 09/19/2022    HGB 11.9 03/07/2022     11/21/2023   No results found for: \"PSA\"    Imaging:                Medical Tests:                Summary of old records / correspondence / consultant report:               Request outside records:             SCREENING ASSESSMENT      Screening for Glaucoma:  Previous screening for glaucoma?: Yes    Hearing Loss Screen:  Finger Rub Hearing Test (right ear): Borderline  Finger Rub Hearing Test (left ear): Borderline    Urinary Incontinence Screen:  Episodes of urinary incontinence? :   NO    Depression Screen:    PHQ-2 Depression Screening  Little interest or pleasure in doing things? Not at all   Feeling down, depressed, or hopeless? Several days   PHQ-2 Total Score 1         PHQ-2: 1 (Not depressed)    PHQ-9: 1-4 (Minimal Depression)     FUNCTIONAL, FALL RISK, & COGNITIVE SCREENING     A) Functional and cognitive status based on patient responses:        11/25/2024     7:44 PM   Functional & Cognitive Status   Do you have difficulty preparing food and eating? No    Do you have difficulty bathing yourself, getting dressed or grooming yourself? No    Do you have difficulty using the toilet? No    Do you have difficulty moving around from place to place? No    Do you have trouble with steps or getting out of a bed or a chair? No    Current Diet Well Balanced Diet    Dental Exam Up to date    Eye Exam Not up to date    Exercise (times per week) 7 times per week    Current Exercises Include House " Cleaning;Other;Walking;Yard Work    Do you need help using the phone?  No    Are you deaf or do you have serious difficulty hearing?  No    Do you need help to go to places out of walking distance? No    Do you need help shopping? No    Do you need help preparing meals?  No    Do you need help with housework?  No    Do you need help with laundry? No    Do you need help taking your medications? No    Do you need help managing money? No    Do you ever drive or ride in a car without wearing a seat belt? No    Have you felt unusual stress, anger or loneliness in the last month? Yes    Who do you live with? Spouse    If you need help, do you have trouble finding someone available to you? Yes    Have you been bothered in the last four weeks by sexual problems? Yes    Do you have difficulty concentrating, remembering or making decisions? No        Patient-reported       B) Assessment of Fall Risk:    Fall Risk Assessment was completed, and patient is at LOW risk for falls.    Need for further evaluation of gait, strength, and balance? : NO    Timed Up and Go (TUG): 6 seconds   (>= 12 seconds indicates high risk for falling)    Observable abnormalities included:   Normal balance and gait pattern    C. Assessment of Cognitive Function:    Mini-Cog Test:     1) Registration (3 objects): YES   2) Clock Draw: Passed? : Yes   3) Number of objects recalled: 3 (MA)     FURTHER EVALUATION REQUIRED?:   No    **OVERALL ASSESSMENT OF FUNCTIONAL ABILITY**  (Assessment of ability to perform ADL's (showering/bathing, using toilet, dressing, feeding self, moving self around) and IADL's (use telephone, shop, prepare food, housekeep, do laundry, transport independently, take medications independently, and handle finances)    DEGREE OF FUNCTIONAL IMPAIRMENT:   NONE (based on assessment noted above)    ABILITY TO LIVE INDEPENDENTLY:   Capable of living independently     COUNSELING     A. Identification of Health Risk Factors:    Risk factors  include:   cardiovascular risk factors    B. Age-Appropriate Screening Schedule:  (Refer to the list below for future screening recommendations based on patient's age, sex and/or medical conditions. Orders for these recommended tests are listed in the plan section. The patient has been provided with a written plan)    Health Maintenance Topics  Health Maintenance   Topic Date Due    MAMMOGRAM  03/03/2022    DXA SCAN  03/08/2024    ANNUAL WELLNESS VISIT  11/21/2024    ZOSTER VACCINE (1 of 2) 11/27/2024 (Originally 3/10/2002)    COVID-19 Vaccine (3 - 2024-25 season) 11/29/2024 (Originally 9/1/2024)    INFLUENZA VACCINE  03/31/2025 (Originally 7/1/2024)    Pneumococcal Vaccine 65+ (1 of 1 - PCV) 05/21/2025 (Originally 3/10/2017)    TDAP/TD VACCINES (1 - Tdap) 11/27/2025 (Originally 3/10/1969)    LIPID PANEL  05/21/2025    COLORECTAL CANCER SCREENING  01/09/2030    HEPATITIS C SCREENING  Completed       Health Maintenance Topics Due or Over-Due  Health Maintenance Due   Topic Date Due    MAMMOGRAM  03/03/2022    DXA SCAN  03/08/2024    ANNUAL WELLNESS VISIT  11/21/2024         C. Advanced Care Planning:    Advance Care Planning   ACP discussion was held with the patient during this visit. Patient has an advance directive (not in EMR), copy requested.       D. Patient Self-Management and Personalized Health Advice:    She has been provided with PERSONALIZED COUNSELING/INFORMATION (AVS educational information) about:     optimizing diet/nutrition plans  improving exercise / conditioning  reducing risk for cardiovascular disease (heart, stroke, vascular)      She has been recommended for the following PREVENTATIVE SERVICES which has been performed today, will be ordered today or ordered/performed on upcoming follow-up visit:     LIFESTYLE PREVENTATIVE MEASURES  NUTRITION counseling provided  EXERCISE counseling provided    CARDIOVASCULAR SCREENING  Lipid panel    CANCER SCREENING  COLORECTAL cancer: Colonoscopy/Cologuard  discussed   BREAST CANCER screening discussed and mammogram every 1-2 years recommended    MISC SCREENING  OSTEOPOROSIS screening DISCUSSED    VACCINATION/IMMUNIZATION  Declines all vaccinations today      E. Miscellaneous Items: [id number]    Aspirin use counseling: Does not need ASA (and currently is not on it)    Discussed BMI with her. The BMI is in the acceptable range    Reviewed use of high risk medication in the elderly: YES    Reviewed for potential of harmful drug interactions in the elderly: YES      WRAP UP     ASSESSMENT & PLAN:    1) MEDICARE ANNUAL WELLNESS VISIT    2) OTHER MEDICAL CONDITIONS ADDRESSED TODAY:            Problem List Items Addressed This Visit       Anxiety    Relevant Orders    CBC w AUTO Differential    Comprehensive Metabolic Panel    Tarlov cyst    Overview     Dr Nelson         Relevant Medications    gabapentin (NEURONTIN) 600 MG tablet    Other Relevant Orders    Compliance Drug Analysis, Ur - Urine, Clean Catch    Hypothyroidism    Relevant Medications    levothyroxine (SYNTHROID, LEVOTHROID) 50 MCG tablet    Other Relevant Orders    TSH+Free T4    CBC w AUTO Differential    Comprehensive Metabolic Panel    Hyperlipidemia - Primary    Relevant Orders    Lipid Panel With / Chol / HDL Ratio    CBC w AUTO Differential    Comprehensive Metabolic Panel     Other Visit Diagnoses       High risk medication use        Relevant Orders    Compliance Drug Analysis, Ur - Urine, Clean Catch                    Orders Placed This Encounter   Procedures    Compliance Drug Analysis, Ur - Urine, Clean Catch     Order Specific Question:   Release to patient     Answer:   Routine Release [[id number]]    TSH+Free T4     Order Specific Question:   Release to patient     Answer:   Routine Release [[id number]]    Lipid Panel With / Chol / HDL Ratio     Order Specific Question:   Release to patient     Answer:   Routine Release [[id number]]    Comprehensive Metabolic Panel     Order Specific  Question:   Release to patient     Answer:   Routine Release [[id number]]    CBC w AUTO Differential     Order Specific Question:   Manual Differential     Answer:   No     Order Specific Question:   Release to patient     Answer:   Routine Release [[id number]]        Discussion / Summary:  Nicola reviewed, controlled substance contract up-to-date and drug screen ordered for gabapentin  Continue all current medications and follow-up in 6 months for chronic medical, earlier if needed      Medications as of TODAY:              Current Outpatient Medications   Medication Sig Dispense Refill    busPIRone (BUSPAR) 5 MG tablet Take 1 tablet by mouth 3 (Three) Times a Day As Needed (anxiety). 30 tablet 0    gabapentin (NEURONTIN) 600 MG tablet TAKE ONE TABLET BY MOUTH THREE TIMES A DAY (Patient taking differently: Take 1 tablet by mouth 2 (Two) Times a Day.) 270 tablet 5    levothyroxine (SYNTHROID, LEVOTHROID) 50 MCG tablet TAKE 1 TABLET BY MOUTH DAILY 90 tablet 1    Multiple Vitamin (MULTIVITAMIN) capsule Take 1 capsule by mouth Daily.       No current facility-administered medications for this visit.         FOLLOW-UP:            Return in about 6 months (around 5/27/2025) for Next scheduled follow up.              Future Appointments   Date Time Provider Department Center   12/18/2024 10:30 AM Hero Balderas MD [facility]   5/27/2025 10:30 AM Azam Colon DNP, APRN [facility]         After Visit Summary (AVS) including the Personalized Prevention  Plan Services (PPPS) was either printed and given to the patient at check-out today and/or sent to [facility] for review.

## 2024-11-28 LAB
ALBUMIN SERPL-MCNC: 4.5 G/DL (ref 3.8–4.8)
ALP SERPL-CCNC: 88 IU/L (ref 44–121)
ALT SERPL-CCNC: 20 IU/L (ref 0–32)
AST SERPL-CCNC: 24 IU/L (ref 0–40)
BASOPHILS # BLD AUTO: 0 X10E3/UL (ref 0–0.2)
BASOPHILS NFR BLD AUTO: 1 %
BILIRUB SERPL-MCNC: 0.4 MG/DL (ref 0–1.2)
BUN SERPL-MCNC: 22 MG/DL (ref 8–27)
BUN/CREAT SERPL: 20 (ref 12–28)
CALCIUM SERPL-MCNC: 9.9 MG/DL (ref 8.7–10.3)
CHLORIDE SERPL-SCNC: 101 MMOL/L (ref 96–106)
CHOLEST SERPL-MCNC: 234 MG/DL (ref 100–199)
CHOLEST/HDLC SERPL: 3.4 RATIO (ref 0–4.4)
CO2 SERPL-SCNC: 28 MMOL/L (ref 20–29)
CREAT SERPL-MCNC: 1.1 MG/DL (ref 0.57–1)
EGFRCR SERPLBLD CKD-EPI 2021: 53 ML/MIN/1.73
EOSINOPHIL # BLD AUTO: 0.1 X10E3/UL (ref 0–0.4)
EOSINOPHIL NFR BLD AUTO: 2 %
ERYTHROCYTE [DISTWIDTH] IN BLOOD BY AUTOMATED COUNT: 11.8 % (ref 11.7–15.4)
GLOBULIN SER CALC-MCNC: 2.3 G/DL (ref 1.5–4.5)
GLUCOSE SERPL-MCNC: 93 MG/DL (ref 70–99)
HCT VFR BLD AUTO: 38.5 % (ref 34–46.6)
HDLC SERPL-MCNC: 68 MG/DL
HGB BLD-MCNC: 12.6 G/DL (ref 11.1–15.9)
IMM GRANULOCYTES # BLD AUTO: 0 X10E3/UL (ref 0–0.1)
IMM GRANULOCYTES NFR BLD AUTO: 0 %
LDLC SERPL CALC-MCNC: 146 MG/DL (ref 0–99)
LYMPHOCYTES # BLD AUTO: 1.4 X10E3/UL (ref 0.7–3.1)
LYMPHOCYTES NFR BLD AUTO: 26 %
MCH RBC QN AUTO: 31.4 PG (ref 26.6–33)
MCHC RBC AUTO-ENTMCNC: 32.7 G/DL (ref 31.5–35.7)
MCV RBC AUTO: 96 FL (ref 79–97)
MONOCYTES # BLD AUTO: 0.7 X10E3/UL (ref 0.1–0.9)
MONOCYTES NFR BLD AUTO: 12 %
NEUTROPHILS # BLD AUTO: 3.1 X10E3/UL (ref 1.4–7)
NEUTROPHILS NFR BLD AUTO: 59 %
PLATELET # BLD AUTO: 174 X10E3/UL (ref 150–450)
POTASSIUM SERPL-SCNC: 4.3 MMOL/L (ref 3.5–5.2)
PROT SERPL-MCNC: 6.8 G/DL (ref 6–8.5)
RBC # BLD AUTO: 4.01 X10E6/UL (ref 3.77–5.28)
SODIUM SERPL-SCNC: 142 MMOL/L (ref 134–144)
T4 FREE SERPL-MCNC: 1.18 NG/DL (ref 0.82–1.77)
TRIGL SERPL-MCNC: 115 MG/DL (ref 0–149)
TSH SERPL DL<=0.005 MIU/L-ACNC: 2.15 UIU/ML (ref 0.45–4.5)
VLDLC SERPL CALC-MCNC: 20 MG/DL (ref 5–40)
WBC # BLD AUTO: 5.3 X10E3/UL (ref 3.4–10.8)

## 2024-12-05 LAB — DRUGS UR: NORMAL

## 2024-12-16 NOTE — PROGRESS NOTES
Subjective   Patient ID: Tricia Cruz is a 74 y.o. female is here today for follow-up.    Very nice lady is with her .  I been treating her with gabapentin for her large sacral Tarlov cyst.  She had wanted to operated on by Dr. Liu in [place] but I discouraged it and she remains on the gabapentin that I give to her at 600 mg twice daily.  Is actually prescribed 3 times daily but she only takes it twice a day.  It seems to control her symptoms of pain numbness and tingling in her right leg and genital area which come from the cyst.  She is functional.  She does not have any ill effects from the medicines.  Will continue it.  She knows this is probably going to be lifelong.  Will have her come back in 1 year.  If there are any worsening of symptoms she will let us know.  I also told her that she could become symptomatic in her back and her leg from degenerative disease as well.    History of Present Illness    The following portions of the patient's history were reviewed and updated as appropriate: allergies, current medications, past family history, past medical history, past social history, past surgical history, and problem list.    Review of Systems   Constitutional:  Negative for fever.   Musculoskeletal:  Positive for back pain and gait problem.   Neurological:  Positive for weakness. Negative for numbness.   All other systems reviewed and are negative.      Objective   Physical Exam  Constitutional:       General: She is awake.      Appearance: She is well-developed.   HENT:      Head: Normocephalic and atraumatic.   Eyes:      General: Lids are normal.      Extraocular Movements: Extraocular movements intact.      Conjunctiva/sclera: Conjunctivae normal.      Pupils: Pupils are equal, round, and reactive to light.   Neck:      Vascular: No carotid bruit.   Neurological:      Mental Status: She is alert.      Coordination: Coordination is intact.      Deep Tendon Reflexes:      Reflex Scores:        Tricep reflexes are 2+ on the right side and 2+ on the left side.       Bicep reflexes are 2+ on the right side and 2+ on the left side.       Brachioradialis reflexes are 2+ on the right side and 2+ on the left side.       Patellar reflexes are 2+ on the right side and 2+ on the left side.       Achilles reflexes are 2+ on the right side and 2+ on the left side.  Psychiatric:         Speech: Speech normal.       Neurological Exam  Mental Status  Awake and alert. Oriented only to person, place, time and situation. Recent and remote memory are intact. Speech is normal. Language is fluent with no aphasia. Attention and concentration are normal. Fund of knowledge is appropriate for level of education.    Cranial Nerves  CN II: Visual acuity is normal. Visual fields full to confrontation.  CN III, IV, VI: Extraocular movements intact bilaterally. Normal lids and orbits bilaterally. Pupils equal round and reactive to light bilaterally.  CN V: Facial sensation is normal.  CN VII: Full and symmetric facial movement.  CN IX, X: Palate elevates symmetrically. Normal gag reflex.  CN XI: Shoulder shrug strength is normal.  CN XII: Tongue midline without atrophy or fasciculations.    Motor  Normal muscle bulk throughout. Normal muscle tone.                                               Right                     Left  Rhomboids                            5                          5  Infraspinatus                          5                          5  Supraspinatus                       5                          5  Deltoid                                   5                          5   Biceps                                   5                          5  Brachioradialis                      5                          5   Triceps                                  5                          5   Pronator                                5                          5   Supinator                              5                            5   Wrist flexor                            5                          5   Wrist extensor                       5                          5   Finger flexor                          5                          5   Finger extensor                     5                          5   Interossei                              5                          5   Abductor pollicis brevis         5                          5   Flexor pollicis brevis             5                          5   Opponens pollicis                 5                          5  Extensor digitorum               5                          5  Abductor digiti minimi           5                          5   Abdominal                            5                          5  Glutei                                    5                          5  Hip abductor                         5                          5  Hip adductor                         5                          5   Iliopsoas                               5                          5   Quadriceps                           5                          5   Hamstring                             5                          5   Gastrocnemius                     5                           5   Anterior tibialis                      5                          5   Posterior tibialis                    5                          5   Peroneal                               5                          5  Ankle dorsiflexor                   5                          5  Ankle plantar flexor              5                           5  Extensor hallucis longus      5                           5    Sensory  Light touch is normal in upper and lower extremities. Proprioception is normal in upper and lower extremities.     Reflexes                                            Right                      Left  Brachioradialis                    2+                         2+  Biceps                                 2+                          2+  Triceps                                2+                         2+  Finger flex                           2+                         2+  Hamstring                            2+                         2+  Patellar                                2+                         2+  Achilles                                2+                         2+    Coordination    Finger-to-nose, rapid alternating movements and heel-to-shin normal bilaterally without dysmetria.    Gait  Casual gait is normal including stance, stride, and arm swing.Normal toe walking. Normal heel walking. Normal tandem gait.       Assessment & Plan   Independent Review of Radiographic Studies:      I reviewed the lumbar MRI done in May 2020 which shows a Tarlov cyst in the sacrum is quite prominent and mild disc disease at L3-L4, L4-L5, and L5-S1.  Agree with the report     Medical Decision Making:      She will continue her gabapentin at 600 mg twice daily.  Will continue to fill it electronically.  Will have her come back in 1 year.  If symptoms get worse, she will let me know.      Diagnoses and all orders for this visit:    1. Tarlov cyst (Primary)    2. Degeneration of intervertebral disc of lumbar region with discogenic back pain and lower extremity pain    3. Neuropathic pain, leg, right      Return in about 1 year (around 12/18/2025) for face to face.

## 2024-12-18 ENCOUNTER — OFFICE VISIT (OUTPATIENT)
Dept: NEUROSURGERY | Facility: CLINIC | Age: 74
End: 2024-12-18
Payer: MEDICARE

## 2024-12-18 VITALS
WEIGHT: 152 LBS | SYSTOLIC BLOOD PRESSURE: 122 MMHG | DIASTOLIC BLOOD PRESSURE: 74 MMHG | RESPIRATION RATE: 20 BRPM | BODY MASS INDEX: 23.86 KG/M2 | HEIGHT: 67 IN

## 2024-12-18 DIAGNOSIS — M79.2 NEUROPATHIC PAIN, LEG, RIGHT: ICD-10-CM

## 2024-12-18 DIAGNOSIS — G96.191 TARLOV CYST: Primary | ICD-10-CM

## 2024-12-18 DIAGNOSIS — M51.362 DEGENERATION OF INTERVERTEBRAL DISC OF LUMBAR REGION WITH DISCOGENIC BACK PAIN AND LOWER EXTREMITY PAIN: ICD-10-CM

## 2024-12-18 PROCEDURE — 99213 OFFICE O/P EST LOW 20 MIN: CPT | Performed by: NEUROLOGICAL SURGERY

## 2024-12-18 PROCEDURE — 1159F MED LIST DOCD IN RCRD: CPT | Performed by: NEUROLOGICAL SURGERY

## 2024-12-18 PROCEDURE — 1160F RVW MEDS BY RX/DR IN RCRD: CPT | Performed by: NEUROLOGICAL SURGERY

## 2024-12-25 DIAGNOSIS — E03.9 ACQUIRED HYPOTHYROIDISM: ICD-10-CM

## 2024-12-26 RX ORDER — LEVOTHYROXINE SODIUM 50 UG/1
50 TABLET ORAL DAILY
Qty: 90 TABLET | Refills: 1 | Status: SHIPPED | OUTPATIENT
Start: 2024-12-26

## 2024-12-30 ENCOUNTER — OFFICE VISIT (OUTPATIENT)
Dept: INTERNAL MEDICINE | Age: 74
End: 2024-12-30
Payer: MEDICARE

## 2024-12-30 VITALS
SYSTOLIC BLOOD PRESSURE: 116 MMHG | RESPIRATION RATE: 18 BRPM | OXYGEN SATURATION: 96 % | HEART RATE: 96 BPM | TEMPERATURE: 96.8 F | WEIGHT: 145 LBS | BODY MASS INDEX: 22.76 KG/M2 | DIASTOLIC BLOOD PRESSURE: 82 MMHG | HEIGHT: 67 IN

## 2024-12-30 DIAGNOSIS — R49.0 HOARSE: ICD-10-CM

## 2024-12-30 DIAGNOSIS — U07.1 COVID-19 VIRUS DETECTED: Primary | ICD-10-CM

## 2024-12-30 LAB
EXPIRATION DATE: ABNORMAL
EXPIRATION DATE: NORMAL
FLUAV AG UPPER RESP QL IA.RAPID: NOT DETECTED
FLUBV AG UPPER RESP QL IA.RAPID: NOT DETECTED
INTERNAL CONTROL: ABNORMAL
INTERNAL CONTROL: NORMAL
Lab: ABNORMAL
Lab: NORMAL
S PYO AG THROAT QL: NEGATIVE
SARS-COV-2 AG UPPER RESP QL IA.RAPID: DETECTED

## 2024-12-30 PROCEDURE — 1125F AMNT PAIN NOTED PAIN PRSNT: CPT | Performed by: PHYSICIAN ASSISTANT

## 2024-12-30 PROCEDURE — 99213 OFFICE O/P EST LOW 20 MIN: CPT | Performed by: PHYSICIAN ASSISTANT

## 2024-12-30 PROCEDURE — 87428 SARSCOV & INF VIR A&B AG IA: CPT | Performed by: PHYSICIAN ASSISTANT

## 2024-12-30 PROCEDURE — 87880 STREP A ASSAY W/OPTIC: CPT | Performed by: PHYSICIAN ASSISTANT

## 2024-12-30 NOTE — PROGRESS NOTES
VONNIE BARROSO PA-C                  I  N  T  E  R  N  A  L    M  E  D  I  C  I  N  E         ENCOUNTER DATE:  12/30/2024    Tricia Cruz / 74 y.o. / female    OFFICE VISIT ENCOUNTER       CHIEF COMPLAINT / REASON FOR OFFICE VISIT     Earache (Left- started Friday; sudafed, [employer] brand robotussin; ), Sore Throat (raw), Choking (Hard to swallow/ drainage), Fever (Low grade ), Chills (On and off; ), and Nasal Congestion      ASSESSMENT & PLAN     Problem List Items Addressed This Visit    None  Visit Diagnoses       COVID-19 virus detected    -  Primary    Relevant Medications    Nirmatrelvir&Ritonavir 150/100 (PAXLOVID) 10 x 150 MG & 10 x 100MG tablet therapy pack tablet (for renal adjustment)    Other Relevant Orders    POCT SARS-CoV-2 Antigen JUNE + Flu (Completed)    Hoarse        Relevant Orders    POCT rapid strep A (Completed)          Orders Placed This Encounter   Procedures    POCT rapid strep A     Order Specific Question:   Release to patient     Answer:   Routine Release [[id number]]    POCT SARS-CoV-2 Antigen JUNE + Flu     Order Specific Question:   Release to patient     Answer:   Routine Release [[id number]]     New Medications Ordered This Visit   Medications    Nirmatrelvir&Ritonavir 150/100 (PAXLOVID) 10 x 150 MG & 10 x 100MG tablet therapy pack tablet (for renal adjustment)     Sig: Take 2 tablets by mouth 2 (Two) Times a Day.     Dispense:  20 tablet     Refill:  0     eGFR of 53. If unable to break a box, update sig with remaining and discarded doses and dispense full box.     Order Specific Question:   I have reviewed the patient's medication list prior to prescribing Paxlovid due to the risk of serious adverse reactions secondary to drug interactions. I will consult with pharmacy, if needed     Answer:   Patient's medication list reviewed       SUMMARY/DISCUSSION  POCT Covid-Sars-2 POSITIVE on today.  Negative for flu A/B and strep.  Reduced dose PAXLOVID  "antiviral COVID therapy to be prescribed for mild-to-moderate COVID symptoms.  Most recent EGFR on file < 60 mL/min (53 on 11/27/24). Daily dose prescribed at a decreased 2 tabs BID x 5 days.  Patient's current prescribed medications reviewed for any significant drug-to-drug interactions with the Paxlovid.  Any complicating medications to be held until after Paxlovid dosing is complete.  Patient counseled as to most common side effects including headache, nausea, vomiting, stomach upset, stomach pain, elevated blood pressure, and feelings of general unwellness.  Patient informed to stop the medication and immediately seek medical attention for urinary changes including darkening of urine color and flank pain, skin rash, painful ulcers to mouth or skin, swelling of of tongue or lips, difficulty swallowing or breathing, throat tightness, hoarseness, and yellowing of eyes or skin.         Next Appointment:     Return if symptoms worsen or fail to improve. And, for Follow-up with Azam Colon, MIROSLAVA, APRN as scheduled.      VITAL SIGNS     Vitals:    12/30/24 1049   BP: 116/82   BP Location: Left arm   Patient Position: Sitting   Cuff Size: Adult   Pulse: 96   Resp: 18   Temp: 96.8 °F (36 °C)   TempSrc: Temporal   SpO2: 96%   Weight: 65.8 kg (145 lb)   Height: 170.2 cm (67.01\")       BP Readings from Last 3 Encounters:   12/30/24 116/82   12/18/24 122/74   11/27/24 148/80     Wt Readings from Last 3 Encounters:   12/30/24 65.8 kg (145 lb)   12/18/24 68.9 kg (152 lb)   11/27/24 69.2 kg (152 lb 9.6 oz)     Body mass index is 22.7 kg/m².         No data to display                   MEDICATIONS AT THE TIME OF OFFICE VISIT     Current Outpatient Medications on File Prior to Visit   Medication Sig    busPIRone (BUSPAR) 5 MG tablet Take 1 tablet by mouth 3 (Three) Times a Day As Needed (anxiety).    gabapentin (NEURONTIN) 600 MG tablet TAKE ONE TABLET BY MOUTH THREE TIMES A DAY (Patient taking differently: Take 1 tablet by mouth " 2 (Two) Times a Day.)    levothyroxine (SYNTHROID, LEVOTHROID) 50 MCG tablet TAKE 1 TABLET BY MOUTH DAILY    Multiple Vitamin (MULTIVITAMIN) capsule Take 1 capsule by mouth Daily.     No current facility-administered medications on file prior to visit.          HISTORY OF PRESENT ILLNESS     PT is a 74-year-old WM with hypothyroidism who presents with symptoms of flulike symptoms and left ear pain.    She reports sore throat, fever, chills, nasal congestion, and left ear pain beginning on last Friday.  Left ear is sharply painful. She denies nausea, vomiting, diarrhea, or loss of taste and smell.  She has attempted pseudoephedrine and [employer] brand Robitussin with with minimal improvement.    POCT SARS-COV-2 positive in office on today.    Patient Care Team:  Azam Colon, MIROSLAVA, APRN as PCP - General (Internal Medicine)  Hero Balderas MD as Surgeon (Neurosurgery)    REVIEW OF SYSTEMS     Review of Systems   As Per HPI.  All other systems negative.     PHYSICAL EXAMINATION     Physical Exam  Vitals and nursing note reviewed.   Constitutional:       General: She is not in acute distress.     Appearance: Normal appearance. She is not ill-appearing.   HENT:      Head: Normocephalic and atraumatic.      Right Ear: Hearing, tympanic membrane, ear canal and external ear normal. There is no impacted cerumen.      Left Ear: Hearing, tympanic membrane, ear canal and external ear normal. There is no impacted cerumen.      Nose: Nose normal.      Right Sinus: No maxillary sinus tenderness or frontal sinus tenderness.      Left Sinus: No maxillary sinus tenderness or frontal sinus tenderness.      Mouth/Throat:      Mouth: Mucous membranes are moist.      Pharynx: Oropharynx is clear. No oropharyngeal exudate or posterior oropharyngeal erythema.   Cardiovascular:      Rate and Rhythm: Normal rate and regular rhythm.      Pulses: Normal pulses.      Heart sounds: Normal heart sounds. No murmur heard.     No friction rub. No  "gallop.   Pulmonary:      Effort: Pulmonary effort is normal. No respiratory distress.      Breath sounds: Normal breath sounds. No stridor. No wheezing.   Lymphadenopathy:      Head:      Right side of head: No submental, submandibular, tonsillar or preauricular adenopathy.      Left side of head: No submental, submandibular, tonsillar or preauricular adenopathy.      Cervical: No cervical adenopathy.   Neurological:      Mental Status: She is alert.   Psychiatric:         Mood and Affect: Mood normal.         Behavior: Behavior normal.             REVIEWED DATA     Labs:     Lab Results   Component Value Date     11/27/2024    K 4.3 11/27/2024    CALCIUM 9.9 11/27/2024    AST 24 11/27/2024    ALT 20 11/27/2024    BUN 22 11/27/2024    CREATININE 1.10 (H) 11/27/2024    CREATININE 1.14 (H) 05/21/2024    CREATININE 0.95 11/21/2023    EGFRRESULT 53 (L) 11/27/2024   No results found for: \"HGBA1C\"  Lab Results   Component Value Date     (H) 11/27/2024     (H) 05/21/2024     (H) 11/21/2023    HDL 68 11/27/2024    HDL 75 05/21/2024    TRIG 115 11/27/2024    TRIG 60 05/21/2024     Lab Results   Component Value Date    TSH 2.150 11/27/2024    TSH 0.971 05/21/2024    TSH 1.910 11/21/2023    FREET4 1.18 11/27/2024    FREET4 1.15 05/21/2024    FREET4 1.04 11/21/2023     Lab Results   Component Value Date    WBC 5.3 11/27/2024    HGB 12.6 11/27/2024     11/27/2024   No results found for: \"MALBCRERATIO\"          Imaging:               Medical Tests:               Summary of old records / correspondence / consultant report:             Request outside records:       "

## 2025-03-20 NOTE — PROGRESS NOTES
"    I N T E R N A L  M E D I C I N E  PERLA GELLER, APRN      ENCOUNTER DATE:  05/21/2024    Tricia Cruz / 74 y.o. / female      CHIEF COMPLAINT / REASON FOR OFFICE VISIT     Hypothyroidism, Hyperlipidemia, and Back Pain      ASSESSMENT & PLAN     Problem List Items Addressed This Visit       Tarlov cyst    Overview     Dr Nelson         Relevant Medications    gabapentin (NEURONTIN) 600 MG tablet    Other Relevant Orders    Compliance Drug Analysis, Ur - Urine, Clean Catch    Hypothyroidism    Relevant Medications    levothyroxine (SYNTHROID, LEVOTHROID) 50 MCG tablet    Other Relevant Orders    TSH+Free T4    Hyperlipidemia - Primary    Relevant Orders    Comprehensive Metabolic Panel    Lipid Panel With / Chol / HDL Ratio     Other Visit Diagnoses       High risk medication use        Relevant Orders    Compliance Drug Analysis, Ur - Urine, Clean Catch    Vitamin D deficiency        Relevant Orders    Vitamin D,25-Hydroxy          Orders Placed This Encounter   Procedures    Comprehensive Metabolic Panel    Lipid Panel With / Chol / HDL Ratio    TSH+Free T4    Compliance Drug Analysis, Ur - Urine, Clean Catch    Vitamin D,25-Hydroxy     No orders of the defined types were placed in this encounter.      SUMMARY/DISCUSSION  [place] reviewed, controlled substance contract until November 2023.  Drug screen ordered for gabapentin.  Continue all current medications  Declines updated vaccinations    Next Appointment with me: Visit date not found    Return in about 6 months (around 11/21/2024) for Medicare Wellness.      VITAL SIGNS     Visit Vitals  /86 (BP Location: Left arm, Patient Position: Sitting, Cuff Size: Adult)   Pulse 67   Temp 98.5 °F (36.9 °C) (Temporal)   Ht 170.2 cm (67.01\")   Wt 68 kg (150 lb)   SpO2 99%   BMI 23.49 kg/m²     Wt Readings from Last 3 Encounters:   05/21/24 68 kg (150 lb)   12/20/23 68.5 kg (151 lb)   11/21/23 68.9 kg (151 lb 12.8 oz)     Body mass index is 23.49 " Physical Therapy    Visit Type: treatment  SUBJECTIVE  RN cleared.  Patient in bed, PCT in room, states just finished getting patient cleaned up. Had large BM and incontinent of urine.  Patient with eyes closed but talking, states he has some pain in his right leg.   \"I can walk around the room.\"  Patient / Family Goal: unable to state    Pain     Location: right knee    At onset of session (out of 10): 8     OBJECTIVE     Cognitive Status   Functional Communication   - Forms of Communication: verbal  Attention Span    - Attention: impaired - difficulty dividing attention - attends with cues to redirect   - Attention impairment: distractibility and reduced memory  Following Direction   - follows one step commands with repetition and follows one step commands with increased time  Transition Between Tasks   - transitions with cues  Executive Function    - Organization: impaired   - Sequencing: impaired   - Problem Solving: impaired   - Termination: impaired  Memory   - impaired  Safety Awareness/Insight   - impaired  Awareness of Deficits   - not aware of deficits    Vitals:  Heart Rate (beats per minute): 84    Patient Activity Tolerance: 1 to 1 activity to rest         Bed Mobility  - Rolling left: total assist - non-dependent, 2 person, with verbal cues, with tactile cues (cues and assist to reach for bed rail)  - Rolling right: total assist - non-dependent, 2 person, with tactile cues, with verbal cues (cues and assist to reach for bed rail and to flex left knee)  Reposition in bed: dependent x 2 for boosting.  Transfers  Not safe/appropriate to attempt sit to stand     Interventions     Supine    Lower Extremity: Bilateral: Supine LE exercises: toe wiggling. AROM, 10 reps, 1 sets  Seated    Lower Extremity: Left: knee extensions, AROM, 10 reps, 2 sets  Additional exercise details: Maximal cues for exercise technique, able to perform small range left knee extensions sitting edge of bed  Training provided: activity  kg/m².    MEDICATIONS AT THE TIME OF OFFICE VISIT     Current Outpatient Medications on File Prior to Visit   Medication Sig    gabapentin (NEURONTIN) 600 MG tablet TAKE ONE TABLET BY MOUTH THREE TIMES A DAY    levothyroxine (SYNTHROID, LEVOTHROID) 50 MCG tablet TAKE 1 TABLET BY MOUTH DAILY    Multiple Vitamin (MULTIVITAMIN) capsule Take 1 capsule by mouth Daily.     No current facility-administered medications on file prior to visit.      HISTORY OF PRESENT ILLNESS     Hypothyroidism well-controlled with levothyroxine 50 MCG daily, last TSH 1.910.  Denies any significant fatigue, unexplained weight gain, constipation.     Mild hyperlipidemia with last LDL of 135, triglycerides of 111 and HDL 69.  Stress test 2021 low risk/normal.      Followed regularly by OB/GYN at [facility].  Scheduled for mammogram through their office.  DEXA scan March 2022 showed osteopenia, no vitamin D supplement at this time. [facility] performs routine Pap, pelvic exam.      Followed by neurosurgery for Tarlov cyst.  Previous MRI of the pelvis without contrast completed by [facility] imaging on May 11, 2020.  Stable left S2 sacral Tarlov cyst at that time.  Measured 2.1 cm x 2.7 cm x 3.4 cm.  Updated MRI of the pelvis March 2022 showing continued stability of the Tarlov cyst with no significant increase in size. Taking gabapentin 600 mg 3 times daily with moderate relief, prescription through neurosurgery. No new lower extremity weakness, numbness, tingling, saddle paresthesia, or incontinence.   Continues gabapentin for pain control.  Moderately helping.     Colon cancer screening cologuard in March 2022 negative.     REVIEW OF SYSTEMS     Constitutional neg except per HPI   Resp neg  CV neg  Musc chronic back pain      PHYSICAL EXAMINATION     Physical Exam  Constitutional  No distress  Cardiovascular Rate  normal . Rhythm: regular . Heart sounds:  normal  Pulmonary/Chest  Effort normal. Breath sounds:  normal  Psychiatric  Alert.  tolerance, balance retraining, bed mobility training, safety training, positioning and energy conservation    Skilled input: Verbal instruction/cues and tactile instruction/cues  Verbal Consent: Writer verbally educated and received verbal consent for hand placement, positioning of patient, and techniques to be performed today from patient for therapist position for techniques, hand placement and palpation for techniques and clothing adjustments for techniques as described above and how they are pertinent to the patient's plan of care.    Patient overall lethargic/confused.  Patient unaware of his deficits, kept stating he could \"walk around the room\"    required significant assist x 2 for repositioning in bed and supine<>sit.  Not appropriate to attempt standing/transfers at this time.  Sat edge of bed x 5 minutes, assist ranging from brief stand by (3-5 seconds) to moderate assist to keep from leaning posteriorly and to left.  Immobilizer on right leg.  Left with heel protector boots on post session.           Education:   - Present and ready to learn: patient  Education provided during session:  - positioning, safety, energy conservation and bed mobility techniques  - Results of above outlined education: Needs reinforcement    ASSESSMENT   Progress: slow progress  Interfering components: decreased activity tolerance and cognitive deficits    Discharge needs based on today's assessment:   - Current level of function: significantly below baseline level of function   - Therapy needs at discharge: therapy 5 or more times per week   - Activities of daily living (ADLs) requiring support at discharge: bed mobility, ambulation, transfers and stairs   - Instrumental activities of daily living (IADLs) requiring support at discharge: community mobility, health/medication management, meal preparation, emergency responses, home management, shopping and financial management   - Impairments that require further therapy  "Judgment and thought content normal. Mood normal      REVIEWED DATA     Labs:   Lab Results   Component Value Date    GLUCOSE 89 11/21/2023    BUN 24 11/21/2023    CREATININE 0.95 11/21/2023    EGFRRESULT 64 11/21/2023    EGFR >60 03/24/2015    BCR 25 11/21/2023    K 4.0 11/21/2023    CO2 27 11/21/2023    CALCIUM 9.4 11/21/2023    PROTENTOTREF 6.5 11/21/2023    ALBUMIN 4.4 11/21/2023    BILITOT 0.3 11/21/2023    AST 19 11/21/2023    ALT 17 11/21/2023     Lab Results   Component Value Date    WBC 4.3 11/21/2023    HGB 11.9 11/21/2023    HCT 35.7 11/21/2023    MCV 95 11/21/2023     11/21/2023     Lab Results   Component Value Date    CHLPL 223 (H) 11/21/2023    TRIG 111 11/21/2023    HDL 69 11/21/2023     (H) 11/21/2023     No results found for: \"HGBA1C\"    Lab Results   Component Value Date    TSH 1.910 11/21/2023     Imaging:           Medical Tests:           Summary of old records / correspondence / consultant report:           Request outside records:             *Dragon dictation used for documentation.  " intervention: balance, cognition, executive functioning, strength, pain, activity tolerance and safety awareness    AM-PAC  - Generalized Prior Level of Function: IND/MOD I ([facility] 22-24)       Key: MOD A=moderate assistance, IND/MOD I=independent/modified independent  - Generalized Current Level of Function     - Current Mobility Score: 6       AM-PAC Scoring Key= >21 Modified Independent; 20-21 Supervision; 18-19 Minimal assist; 13-17 Moderate assist; 9-12 Max assist; <9 Total assist      Pain at End of Session: RN informed on pain level  Pain: 8/10, location: right knee    PLAN (while hospitalized)  Suggestions for next session as indicated: Work on bed mobility, sitting balance/dangling, scooting edge of bed to prep for transfers    PT Frequency: 3-5 x per week      PT/OT Mobility Equipment for Discharge: to be determined  PT/OT ADL Equipment for Discharge: to be determined     Patient agreement: unable.        GOALS  Long Term Goals: (to be met by time of discharge from hospital)  Rolling left: Patient will complete bed mobility for rolling left moderate assist.  Status: progressing/ongoing  Rolling right: Patient will complete bed mobility for rolling right moderate assist.  Status: progressing/ongoing  Sit to supine: Patient will complete sit to supine moderate assist.  Status: progressing/ongoing  Supine to sit: Patient will complete supine to sit moderate assist.  Status: progressing/ongoing  Sit to stand: Patient will complete sit to stand transfer with.   Status: revised, this goal discontinued, new goal written  Sit stand device: to be assessed.Stand to sit: Patient will complete stand to sit transfer with.   Status: revised, this goal discontinued, new goal written  Ambulation (even): Patient will ambulate on even surface for feet with.   Status: revised, this goal modified  Even device: to be assessed.Documented in the chart in the following areas: Assessment/Plan.      Patient at End of Session:    Location: in bed  Safety measures: alarm system in place/re-engaged  Handoff to: nurse and nurse assistant      Therapy procedure time and total treatment time can be found documented on the Time Entry flowsheet

## 2025-05-27 ENCOUNTER — OFFICE VISIT (OUTPATIENT)
Dept: INTERNAL MEDICINE | Age: 75
End: 2025-05-27
Payer: MEDICARE

## 2025-05-27 VITALS
TEMPERATURE: 97.1 F | BODY MASS INDEX: 23.61 KG/M2 | OXYGEN SATURATION: 100 % | SYSTOLIC BLOOD PRESSURE: 128 MMHG | HEIGHT: 67 IN | DIASTOLIC BLOOD PRESSURE: 68 MMHG | HEART RATE: 73 BPM | WEIGHT: 150.4 LBS

## 2025-05-27 DIAGNOSIS — G96.191 TARLOV CYST: ICD-10-CM

## 2025-05-27 DIAGNOSIS — E78.5 HYPERLIPIDEMIA, UNSPECIFIED HYPERLIPIDEMIA TYPE: ICD-10-CM

## 2025-05-27 DIAGNOSIS — E03.9 HYPOTHYROIDISM, UNSPECIFIED TYPE: Primary | ICD-10-CM

## 2025-05-27 DIAGNOSIS — Z12.11 SCREENING FOR MALIGNANT NEOPLASM OF COLON: ICD-10-CM

## 2025-05-27 DIAGNOSIS — M54.50 LOW BACK PAIN RADIATING TO LOWER EXTREMITY: ICD-10-CM

## 2025-05-27 DIAGNOSIS — M79.606 LOW BACK PAIN RADIATING TO LOWER EXTREMITY: ICD-10-CM

## 2025-05-27 DIAGNOSIS — Z79.899 HIGH RISK MEDICATION USE: ICD-10-CM

## 2025-05-27 NOTE — PROGRESS NOTES
I N T E R N A L  M E D I C I N E  PERLA GELLER, JAY      ENCOUNTER DATE:  05/27/2025    Tricia Cruz / 75 y.o. / female      CHIEF COMPLAINT / REASON FOR OFFICE VISIT     Hypothyroidism and Back Pain      ASSESSMENT & PLAN     Problem List Items Addressed This Visit       Low back pain radiating to lower extremity    Relevant Orders    Compliance Drug Analysis, Ur - Urine, Clean Catch    Tarlov cyst    Overview   Dr Nelson         Relevant Medications    gabapentin (NEURONTIN) 600 MG tablet    Other Relevant Orders    Compliance Drug Analysis, Ur - Urine, Clean Catch    Hypothyroidism - Primary    Relevant Medications    levothyroxine (SYNTHROID, LEVOTHROID) 50 MCG tablet    Other Relevant Orders    TSH+Free T4    Hyperlipidemia    Relevant Orders    UA / M With / Rflx Culture([facility] ONLY) - Urine, Clean Catch    Comprehensive Metabolic Panel    Lipid Panel With / Chol / HDL Ratio     Other Visit Diagnoses         High risk medication use        Relevant Orders    Compliance Drug Analysis, Ur - Urine, Clean Catch      Screening for malignant neoplasm of colon        Relevant Orders    Cologuard - Stool, Per Rectum          Orders Placed This Encounter   Procedures    Cologuard - Stool, Per Rectum    UA / M With / Rflx Culture([facility] ONLY) - Urine, Clean Catch    Comprehensive Metabolic Panel    Lipid Panel With / Chol / HDL Ratio    TSH+Free T4    Compliance Drug Analysis, Ur - Urine, Clean Catch     No orders of the defined types were placed in this encounter.      SUMMARY/DISCUSSION  Controlled substance contract up-to-date until 6-month office visit.  [place] reviewed and drug screen ordered for gabapentin.  Can continue current regimen as well as current regimen for hypothyroidism as long as labs return stable.  Cologuard for colon cancer screening.  Due for mammogram and DEXA scan with OB/GYN.      Next Appointment with me: Visit date not found    Return in about 6 months (around 11/27/2025) for  "Medicare Wellness with chronic medical .      VITAL SIGNS     Visit Vitals  /68   Pulse 73   Temp 97.1 °F (36.2 °C)   Ht 170.2 cm (67.01\")   Wt 68.2 kg (150 lb 6.4 oz)   SpO2 100%   BMI 23.55 kg/m²       Wt Readings from Last 3 Encounters:   05/27/25 68.2 kg (150 lb 6.4 oz)   12/30/24 65.8 kg (145 lb)   12/18/24 68.9 kg (152 lb)     Body mass index is 23.55 kg/m².      MEDICATIONS AT THE TIME OF OFFICE VISIT     Current Outpatient Medications on File Prior to Visit   Medication Sig    gabapentin (NEURONTIN) 600 MG tablet TAKE ONE TABLET BY MOUTH THREE TIMES A DAY (Patient taking differently: Take 1 tablet by mouth 2 (Two) Times a Day.)    levothyroxine (SYNTHROID, LEVOTHROID) 50 MCG tablet TAKE 1 TABLET BY MOUTH DAILY    Multiple Vitamin (MULTIVITAMIN) capsule Take 1 capsule by mouth Daily.    [DISCONTINUED] busPIRone (BUSPAR) 5 MG tablet Take 1 tablet by mouth 3 (Three) Times a Day As Needed (anxiety).    [DISCONTINUED] Nirmatrelvir&Ritonavir 150/100 (PAXLOVID) 10 x 150 MG & 10 x 100MG tablet therapy pack tablet (for renal adjustment) Take 2 tablets by mouth 2 (Two) Times a Day.     No current facility-administered medications on file prior to visit.          HISTORY OF PRESENT ILLNESS     Hypothyroidism well-controlled with levothyroxine 50 MCG daily, last TSH 2.150.  Denies any significant fatigue, unexplained weight gain, constipation.    Mild hyperlipidemia with last LDL of 146, triglycerides of 115 and HDL 68.  Stress test 2021 low risk/normal.     Followed regularly by OB/GYN at [facility].  Scheduled for mammogram through their office.  DEXA scan March 2022 showed osteopenia, no vitamin D supplement at this time. [facility] performs routine Pap, pelvic exam.  Due to schedule.    Followed by neurosurgery for Tarlov cyst.  Previous MRI of the pelvis without contrast completed by [facility] imaging on May 11, 2020.  Stable left S2 sacral Tarlov cyst at that time.  Measured 2.1 cm x 2.7 cm x 3.4 cm.  " "Updated MRI of the pelvis March 2022 showing continued stability of the Tarlov cyst with no significant increase in size. Taking gabapentin 600 mg 3 times daily with moderate relief, prescription through neurosurgery. No new lower extremity weakness, numbness, tingling, saddle paresthesia, or incontinence.   Continues gabapentin for pain control.  Moderately helping.     Colon cancer screening cologuard in March 2022 negative.     REVIEW OF SYSTEMS     Constitutional neg except per HPI   Resp neg  CV neg   Musc Chronic back pain    PHYSICAL EXAMINATION     Physical Exam  Constitutional  No distress  Cardiovascular Rate  normal . Rhythm: regular . Heart sounds:  normal  Pulmonary/Chest  Effort normal. Breath sounds:  normal  Psychiatric  Alert. Judgment and thought content normal. Mood normal      REVIEWED DATA     Labs:   Lab Results   Component Value Date    GLUCOSE 93 11/27/2024    BUN 22 11/27/2024    CREATININE 1.10 (H) 11/27/2024    EGFR 53 (L) 11/27/2024    BCR 20 11/27/2024    K 4.3 11/27/2024    CO2 28 11/27/2024    CALCIUM 9.9 11/27/2024    ALBUMIN 4.5 11/27/2024    BILITOT 0.4 11/27/2024    AST 24 11/27/2024    ALT 20 11/27/2024     Lab Results   Component Value Date    WBC 5.3 11/27/2024    HGB 12.6 11/27/2024    HCT 38.5 11/27/2024    MCV 96 11/27/2024     11/27/2024     Lab Results   Component Value Date    CHLPL 234 (H) 11/27/2024    TRIG 115 11/27/2024    HDL 68 11/27/2024     (H) 11/27/2024      Lab Results   Component Value Date    TSH 2.150 11/27/2024     Brief Urine Lab Results       None          No results found for: \"HGBA1C\"    Imaging:           Medical Tests:           Summary of old records / correspondence / consultant report:           Request outside records:     "

## 2025-06-04 LAB
ALBUMIN SERPL-MCNC: 4.3 G/DL (ref 3.8–4.8)
ALP SERPL-CCNC: 81 IU/L (ref 44–121)
ALT SERPL-CCNC: 19 IU/L (ref 0–32)
APPEARANCE UR: CLEAR
AST SERPL-CCNC: 25 IU/L (ref 0–40)
BACTERIA #/AREA URNS HPF: NORMAL /[HPF]
BILIRUB SERPL-MCNC: 0.4 MG/DL (ref 0–1.2)
BILIRUB UR QL STRIP: NEGATIVE
BUN SERPL-MCNC: 23 MG/DL (ref 8–27)
BUN/CREAT SERPL: 21 (ref 12–28)
CALCIUM SERPL-MCNC: 9.6 MG/DL (ref 8.7–10.3)
CASTS URNS QL MICRO: NORMAL /LPF
CHLORIDE SERPL-SCNC: 103 MMOL/L (ref 96–106)
CHOLEST SERPL-MCNC: 208 MG/DL (ref 100–199)
CHOLEST/HDLC SERPL: 3 RATIO (ref 0–4.4)
CO2 SERPL-SCNC: 24 MMOL/L (ref 20–29)
COLOR UR: YELLOW
CREAT SERPL-MCNC: 1.08 MG/DL (ref 0.57–1)
DRUGS UR: NORMAL
EGFRCR SERPLBLD CKD-EPI 2021: 54 ML/MIN/1.73
EPI CELLS #/AREA URNS HPF: NORMAL /HPF (ref 0–10)
GLOBULIN SER CALC-MCNC: 2.1 G/DL (ref 1.5–4.5)
GLUCOSE SERPL-MCNC: 86 MG/DL (ref 70–99)
GLUCOSE UR QL STRIP: NEGATIVE
HDLC SERPL-MCNC: 69 MG/DL
HGB UR QL STRIP: NEGATIVE
KETONES UR QL STRIP: NEGATIVE
LDLC SERPL CALC-MCNC: 124 MG/DL (ref 0–99)
LEUKOCYTE ESTERASE UR QL STRIP: NEGATIVE
MICRO URNS: NORMAL
MICRO URNS: NORMAL
NITRITE UR QL STRIP: NEGATIVE
PH UR STRIP: 6.5 [PH] (ref 5–7.5)
POTASSIUM SERPL-SCNC: 4.4 MMOL/L (ref 3.5–5.2)
PROT SERPL-MCNC: 6.4 G/DL (ref 6–8.5)
PROT UR QL STRIP: NEGATIVE
RBC #/AREA URNS HPF: NORMAL /HPF (ref 0–2)
SODIUM SERPL-SCNC: 141 MMOL/L (ref 134–144)
SP GR UR STRIP: 1.02 (ref 1–1.03)
T4 FREE SERPL-MCNC: 1.05 NG/DL (ref 0.82–1.77)
TRIGL SERPL-MCNC: 87 MG/DL (ref 0–149)
TSH SERPL DL<=0.005 MIU/L-ACNC: 1.51 UIU/ML (ref 0.45–4.5)
URINALYSIS REFLEX: NORMAL
UROBILINOGEN UR STRIP-MCNC: 0.2 MG/DL (ref 0.2–1)
VLDLC SERPL CALC-MCNC: 15 MG/DL (ref 5–40)
WBC #/AREA URNS HPF: NORMAL /HPF (ref 0–5)

## 2025-06-22 DIAGNOSIS — E03.9 ACQUIRED HYPOTHYROIDISM: ICD-10-CM

## 2025-06-22 RX ORDER — LEVOTHYROXINE SODIUM 50 UG/1
50 TABLET ORAL DAILY
Qty: 90 TABLET | Refills: 1 | Status: SHIPPED | OUTPATIENT
Start: 2025-06-22